# Patient Record
Sex: FEMALE | Race: WHITE | NOT HISPANIC OR LATINO | Employment: FULL TIME | URBAN - METROPOLITAN AREA
[De-identification: names, ages, dates, MRNs, and addresses within clinical notes are randomized per-mention and may not be internally consistent; named-entity substitution may affect disease eponyms.]

---

## 2020-10-23 NOTE — PROGRESS NOTES
"    [facility]    Subjective:       Patient ID: Jerry Quiles is a 45 y.o. female.    Chief Complaint: Establish Care      History was obtained from the patient and supplemented through chart review.  This patient has never been seen by an internal medicine physician with [facility] and is new to me.    HPI:    Patient is a 45 y.o. female with chronic medical problems including vertigo, HTN, acquired hypothyroidism, obesity    HTN  Didn't think HCTZ at prior doses (thinks 12.5 and 25 mg) worked sufficiently for leg swelling  Wanted more diuretic effect  Usuallytakes amlodipine 10, losartan 100, coreg 6.25 BID. Today only losartan and had decreased coreg to just once daily in order to stretch it further  Doesn't know how her blood pressure runs usually    Hx of Vertigo  Meclizine and [place]-Hallpike  Told to follow up with neurology but pt canceled appt when was supposed to be  "Collidiscope vision" helped with turning lights and taking meclizine  Alternating eyes  Will make new appt with neurology, unsure who it was (not with [employer] system)    Leg swelling  Worse at end of day, stands for work  Hoping would be better when BP is better  Never tried compression stockings.    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  Prefers to have care in [facility] due to decrease time away from work    Medical History  Past Medical History:   Diagnosis Date    HTN (hypertension)     Vertigo        Review of Systems   Constitutional: Negative for appetite change, diaphoresis and fever.   HENT: Negative for rhinorrhea and trouble swallowing.    Eyes: Positive for visual disturbance.   Respiratory: Negative for shortness of breath.    Cardiovascular: Positive for leg swelling. Negative for chest pain.   Gastrointestinal: Negative for diarrhea, nausea and vomiting.   Genitourinary: Negative for hematuria.   Musculoskeletal: Positive for myalgias. Negative for gait problem.   Neurological: Positive for dizziness. Negative for " "seizures and weakness.   Psychiatric/Behavioral: Negative for hallucinations. The patient is not nervous/anxious.          Surgical hx, family hx, social hx   No colon cancer, no breast cancer  Have been reviewed      Current Outpatient Medications:     amLODIPine (NORVASC) 10 MG tablet, Take 1 tablet (10 mg total) by mouth once daily., Disp: 30 tablet, Rfl: 0    carvediloL (COREG) 6.25 MG tablet, Take 1 tablet (6.25 mg total) by mouth 2 (two) times daily., Disp: 60 tablet, Rfl: 0    losartan (COZAAR) 100 MG tablet, Take 1 tablet (100 mg total) by mouth once daily., Disp: 30 tablet, Rfl: 0    ZOVIA 1/35E, 28, 1-35 mg-mcg per tablet, , Disp: , Rfl:     hydroCHLOROthiazide (HYDRODIURIL) 25 MG tablet, Take 2 tablets (50 mg total) by mouth once daily., Disp: 30 tablet, Rfl: 0    Objective:        Body mass index is 33.55 kg/m².  Vitals:    10/26/20 1142   Pulse: 80   SpO2: 98%   Weight: 85.9 kg (189 lb 6 oz)   Height: 5' 3" (1.6 m)   PainSc: 0-No pain     Physical Exam  Vitals signs (BMI 33.55) and nursing note reviewed.   Constitutional:       General: She is not in acute distress.     Appearance: Normal appearance. She is well-developed. She is not diaphoretic.   HENT:      Head: Normocephalic and atraumatic.   Eyes:      General: No scleral icterus.  Neck:      Musculoskeletal: Normal range of motion and neck supple.      Vascular: No JVD.   Cardiovascular:      Rate and Rhythm: Normal rate and regular rhythm.      Heart sounds: Normal heart sounds. No murmur.   Pulmonary:      Effort: Pulmonary effort is normal. No respiratory distress.      Breath sounds: Normal breath sounds. No wheezing or rales.   Abdominal:      General: There is no distension.      Palpations: Abdomen is soft. There is no mass.      Tenderness: There is no abdominal tenderness.   Musculoskeletal: Normal range of motion.         General: No swelling.   Skin:     General: Skin is warm and dry.      Capillary Refill: Capillary refill takes " less than 2 seconds.   Neurological:      Mental Status: She is alert and oriented to person, place, and time.      Cranial Nerves: No cranial nerve deficit.   Psychiatric:         Behavior: Behavior normal.           No results found for: WBC, HGB, HCT, PLT, CHOL, TRIG, HDL, LDLDIRECT, ALT, AST, NA, K, CL, CREATININE, BUN, CO2, TSH, PSA, INR, GLUF, HGBA1C, MICROALBUR    The ASCVD Risk score (Albert VALORIE Jr., et al., 2013) failed to calculate for the following reasons:    The systolic blood pressure is missing    Cannot find a previous HDL lab    Cannot find a previous total cholesterol lab    (Imaging have been independently reviewed)  None    Assessment:         1. Annual physical exam    2. Benign essential HTN    3. Leg swelling    4. Screening for HIV (human immunodeficiency virus)    5. Need for hepatitis C screening test    6. Encounter for screening mammogram for malignant neoplasm of breast          Plan:     Jerry was seen today for establish care.    Diagnoses and all orders for this visit:    Annual physical exam  -     Comprehensive Metabolic Panel; Future  -     Lipid Panel; Future  -     Ambulatory referral/consult to Obstetrics / Gynecology; Future    Benign essential HTN  Comments:  Take Coreg BID, losartan and amlodipine and monitor BP  Then start HCTZ 50 mg daily.  Counseled on easing in, backing down if feeling poorly  Given BP log  Orders:  -     amLODIPine (NORVASC) 10 MG tablet; Take 1 tablet (10 mg total) by mouth once daily.  -     carvediloL (COREG) 6.25 MG tablet; Take 1 tablet (6.25 mg total) by mouth 2 (two) times daily.  -     Added hydroCHLOROthiazide (HYDRODIURIL) 25 MG tablet; Take 2 tablets (50 mg total) by mouth once daily.  -     losartan (COZAAR) 100 MG tablet; Take 1 tablet (100 mg total) by mouth once daily.    Leg swelling  Comments:  Compression stockings, exercise, weight loss  Counseled may not improve with improved blood pressure  Orders:  -     COMPRESSION  STOCKINGS    Screening for HIV (human immunodeficiency virus)  -     HIV 1/2 Ag/Ab (4th Gen); Future    Need for hepatitis C screening test  -     Hepatitis C Antibody; Future    Encounter for screening mammogram for malignant neoplasm of breast  -     Mammo Digital Screening Bilat; Future        Health Maintenance  - Lipids: [id number]  - A1C: will check glucose  - Colon Ca Screen:  - Immunizations:    Women's health  - Pap:needs to see ob-GYN  - Mammo: ordered, needs to be scheduled  - Dexa:  N/A   - Contraception: zovia    Follow up in about 2 weeks (around 11/9/2020). BP follow-up, pap smear? Review labs        All medications were reviewed including potential side effects and risks/benefits.  Pt was counseled to call back if anything worsens or if questions arise.    Manav Molina MD  Family Medicine  [facility]  [address] [place] [address] [address] [place] [address]  Phone [phone]  Fax [phone]

## 2020-10-26 ENCOUNTER — OFFICE VISIT (OUTPATIENT)
Dept: INTERNAL MEDICINE | Facility: CLINIC | Age: 45
End: 2020-10-26
Payer: COMMERCIAL

## 2020-10-26 ENCOUNTER — LAB VISIT (OUTPATIENT)
Dept: LAB | Facility: OTHER | Age: 45
End: 2020-10-26
Attending: STUDENT IN AN ORGANIZED HEALTH CARE EDUCATION/TRAINING PROGRAM
Payer: COMMERCIAL

## 2020-10-26 VITALS — WEIGHT: 189.38 LBS | BODY MASS INDEX: 33.55 KG/M2 | HEIGHT: 63 IN | HEART RATE: 80 BPM | OXYGEN SATURATION: 98 %

## 2020-10-26 DIAGNOSIS — Z11.4 SCREENING FOR HIV (HUMAN IMMUNODEFICIENCY VIRUS): ICD-10-CM

## 2020-10-26 DIAGNOSIS — Z00.00 ANNUAL PHYSICAL EXAM: ICD-10-CM

## 2020-10-26 DIAGNOSIS — Z11.59 NEED FOR HEPATITIS C SCREENING TEST: ICD-10-CM

## 2020-10-26 DIAGNOSIS — I10 BENIGN ESSENTIAL HTN: ICD-10-CM

## 2020-10-26 DIAGNOSIS — Z00.00 ANNUAL PHYSICAL EXAM: Primary | ICD-10-CM

## 2020-10-26 DIAGNOSIS — Z12.31 ENCOUNTER FOR SCREENING MAMMOGRAM FOR MALIGNANT NEOPLASM OF BREAST: ICD-10-CM

## 2020-10-26 DIAGNOSIS — M79.89 LEG SWELLING: ICD-10-CM

## 2020-10-26 LAB
ALBUMIN SERPL BCP-MCNC: 3.5 G/DL (ref 3.5–5.2)
ALP SERPL-CCNC: 41 U/L (ref 55–135)
ALT SERPL W/O P-5'-P-CCNC: 9 U/L (ref 10–44)
ANION GAP SERPL CALC-SCNC: 10 MMOL/L (ref 8–16)
AST SERPL-CCNC: 11 U/L (ref 10–40)
BILIRUB SERPL-MCNC: 0.3 MG/DL (ref 0.1–1)
BUN SERPL-MCNC: 13 MG/DL (ref 6–20)
CALCIUM SERPL-MCNC: 8.8 MG/DL (ref 8.7–10.5)
CHLORIDE SERPL-SCNC: 106 MMOL/L (ref 95–110)
CHOLEST SERPL-MCNC: 168 MG/DL (ref 120–199)
CHOLEST/HDLC SERPL: 4.2 {RATIO} (ref 2–5)
CO2 SERPL-SCNC: 21 MMOL/L (ref 23–29)
CREAT SERPL-MCNC: 0.8 MG/DL (ref 0.5–1.4)
EST. GFR  (AFRICAN AMERICAN): >60 ML/MIN/1.73 M^2
EST. GFR  (NON AFRICAN AMERICAN): >60 ML/MIN/1.73 M^2
GLUCOSE SERPL-MCNC: 91 MG/DL (ref 70–110)
HDLC SERPL-MCNC: 40 MG/DL (ref 40–75)
HDLC SERPL: 23.8 % (ref 20–50)
LDLC SERPL CALC-MCNC: 94.6 MG/DL (ref 63–159)
NONHDLC SERPL-MCNC: 128 MG/DL
POTASSIUM SERPL-SCNC: 4 MMOL/L (ref 3.5–5.1)
PROT SERPL-MCNC: 7.3 G/DL (ref 6–8.4)
SODIUM SERPL-SCNC: 137 MMOL/L (ref 136–145)
TRIGL SERPL-MCNC: 167 MG/DL (ref 30–150)

## 2020-10-26 PROCEDURE — 3008F PR BODY MASS INDEX (BMI) DOCUMENTED: ICD-10-PCS | Mod: CPTII,S$GLB,, | Performed by: STUDENT IN AN ORGANIZED HEALTH CARE EDUCATION/TRAINING PROGRAM

## 2020-10-26 PROCEDURE — 80053 COMPREHEN METABOLIC PANEL: CPT

## 2020-10-26 PROCEDURE — 3008F BODY MASS INDEX DOCD: CPT | Mod: CPTII,S$GLB,, | Performed by: STUDENT IN AN ORGANIZED HEALTH CARE EDUCATION/TRAINING PROGRAM

## 2020-10-26 PROCEDURE — 86703 HIV-1/HIV-2 1 RESULT ANTBDY: CPT

## 2020-10-26 PROCEDURE — 99999 PR PBB SHADOW E&M-NEW PATIENT-LVL V: CPT | Mod: PBBFAC,,, | Performed by: STUDENT IN AN ORGANIZED HEALTH CARE EDUCATION/TRAINING PROGRAM

## 2020-10-26 PROCEDURE — 86803 HEPATITIS C AB TEST: CPT

## 2020-10-26 PROCEDURE — 36415 COLL VENOUS BLD VENIPUNCTURE: CPT

## 2020-10-26 PROCEDURE — 99999 PR PBB SHADOW E&M-NEW PATIENT-LVL V: ICD-10-PCS | Mod: PBBFAC,,, | Performed by: STUDENT IN AN ORGANIZED HEALTH CARE EDUCATION/TRAINING PROGRAM

## 2020-10-26 PROCEDURE — 99386 PREV VISIT NEW AGE 40-64: CPT | Mod: S$GLB,,, | Performed by: STUDENT IN AN ORGANIZED HEALTH CARE EDUCATION/TRAINING PROGRAM

## 2020-10-26 PROCEDURE — 80061 LIPID PANEL: CPT

## 2020-10-26 PROCEDURE — 99386 PR PREVENTIVE VISIT,NEW,40-64: ICD-10-PCS | Mod: S$GLB,,, | Performed by: STUDENT IN AN ORGANIZED HEALTH CARE EDUCATION/TRAINING PROGRAM

## 2020-10-26 RX ORDER — LOSARTAN POTASSIUM 100 MG/1
100 TABLET ORAL DAILY
Qty: 30 TABLET | Refills: 0 | Status: SHIPPED | OUTPATIENT
Start: 2020-10-26 | End: 2020-11-17 | Stop reason: SDUPTHER

## 2020-10-26 RX ORDER — CARVEDILOL 6.25 MG/1
6.25 TABLET ORAL 2 TIMES DAILY
Qty: 60 TABLET | Refills: 0 | Status: SHIPPED | OUTPATIENT
Start: 2020-10-26 | End: 2020-11-17 | Stop reason: SDUPTHER

## 2020-10-26 RX ORDER — HYDROCHLOROTHIAZIDE 25 MG/1
50 TABLET ORAL DAILY
Qty: 30 TABLET | Refills: 0 | Status: SHIPPED | OUTPATIENT
Start: 2020-10-26 | End: 2020-11-17 | Stop reason: SDUPTHER

## 2020-10-26 RX ORDER — LOSARTAN POTASSIUM 100 MG/1
100 TABLET ORAL DAILY
Qty: 30 TABLET | Refills: 0 | Status: SHIPPED | OUTPATIENT
Start: 2020-10-26 | End: 2020-10-26 | Stop reason: SDUPTHER

## 2020-10-26 RX ORDER — CARVEDILOL 6.25 MG/1
6.25 TABLET ORAL 2 TIMES DAILY
Qty: 60 TABLET | Refills: 0 | Status: SHIPPED | OUTPATIENT
Start: 2020-10-26 | End: 2020-10-26 | Stop reason: SDUPTHER

## 2020-10-26 RX ORDER — AMLODIPINE BESYLATE 10 MG/1
10 TABLET ORAL DAILY
Qty: 30 TABLET | Refills: 0 | Status: SHIPPED | OUTPATIENT
Start: 2020-10-26 | End: 2020-11-17 | Stop reason: SDUPTHER

## 2020-10-26 RX ORDER — LOSARTAN POTASSIUM 100 MG/1
100 TABLET ORAL
COMMUNITY
Start: 2020-06-15 | End: 2020-10-26 | Stop reason: SDUPTHER

## 2020-10-26 RX ORDER — AMLODIPINE BESYLATE 10 MG/1
10 TABLET ORAL DAILY
Qty: 30 TABLET | Refills: 0 | Status: SHIPPED | OUTPATIENT
Start: 2020-10-26 | End: 2020-10-26 | Stop reason: SDUPTHER

## 2020-10-26 RX ORDER — AMLODIPINE BESYLATE 10 MG/1
10 TABLET ORAL
COMMUNITY
Start: 2020-06-15 | End: 2020-10-26 | Stop reason: SDUPTHER

## 2020-10-26 RX ORDER — HYDROCHLOROTHIAZIDE 25 MG/1
50 TABLET ORAL DAILY
Qty: 30 TABLET | Refills: 0 | Status: SHIPPED | OUTPATIENT
Start: 2020-10-26 | End: 2020-10-26 | Stop reason: SDUPTHER

## 2020-10-26 RX ORDER — CARVEDILOL 6.25 MG/1
6.25 TABLET ORAL
COMMUNITY
Start: 2020-06-15 | End: 2020-10-26 | Stop reason: SDUPTHER

## 2020-10-26 RX ORDER — ETHYNODIOL DIACETATE AND ETHINYL ESTRADIOL 1 MG-35MCG
KIT ORAL
COMMUNITY
Start: 2020-09-14 | End: 2020-11-10 | Stop reason: SDUPTHER

## 2020-10-27 LAB
HCV AB SERPL QL IA: NEGATIVE
HIV 1+2 AB+HIV1 P24 AG SERPL QL IA: NEGATIVE

## 2020-11-10 ENCOUNTER — HOSPITAL ENCOUNTER (OUTPATIENT)
Dept: RADIOLOGY | Facility: OTHER | Age: 45
Discharge: HOME | End: 2020-11-10
Attending: STUDENT IN AN ORGANIZED HEALTH CARE EDUCATION/TRAINING PROGRAM
Payer: COMMERCIAL

## 2020-11-10 ENCOUNTER — OFFICE VISIT (OUTPATIENT)
Dept: OBSTETRICS AND GYNECOLOGY | Facility: CLINIC | Age: 45
End: 2020-11-10
Payer: COMMERCIAL

## 2020-11-10 VITALS
BODY MASS INDEX: 33.75 KG/M2 | HEIGHT: 63 IN | WEIGHT: 190.5 LBS | DIASTOLIC BLOOD PRESSURE: 82 MMHG | SYSTOLIC BLOOD PRESSURE: 118 MMHG

## 2020-11-10 DIAGNOSIS — Z12.31 ENCOUNTER FOR SCREENING MAMMOGRAM FOR MALIGNANT NEOPLASM OF BREAST: ICD-10-CM

## 2020-11-10 DIAGNOSIS — Z00.00 ANNUAL PHYSICAL EXAM: ICD-10-CM

## 2020-11-10 DIAGNOSIS — B35.6 GROIN RINGWORM: Primary | ICD-10-CM

## 2020-11-10 PROCEDURE — 99386 PREV VISIT NEW AGE 40-64: CPT | Mod: S$GLB,,, | Performed by: ADVANCED PRACTICE MIDWIFE

## 2020-11-10 PROCEDURE — 3008F BODY MASS INDEX DOCD: CPT | Mod: CPTII,S$GLB,, | Performed by: ADVANCED PRACTICE MIDWIFE

## 2020-11-10 PROCEDURE — 99999 PR PBB SHADOW E&M-EST. PATIENT-LVL III: ICD-10-PCS | Mod: PBBFAC,,, | Performed by: ADVANCED PRACTICE MIDWIFE

## 2020-11-10 PROCEDURE — 99999 PR PBB SHADOW E&M-EST. PATIENT-LVL III: CPT | Mod: PBBFAC,,, | Performed by: ADVANCED PRACTICE MIDWIFE

## 2020-11-10 PROCEDURE — 77063 MAMMO DIGITAL SCREENING BILAT WITH TOMO: ICD-10-PCS | Mod: 26,,, | Performed by: RADIOLOGY

## 2020-11-10 PROCEDURE — 77067 MAMMO DIGITAL SCREENING BILAT WITH TOMO: ICD-10-PCS | Mod: 26,,, | Performed by: RADIOLOGY

## 2020-11-10 PROCEDURE — 99386 PR PREVENTIVE VISIT,NEW,40-64: ICD-10-PCS | Mod: S$GLB,,, | Performed by: ADVANCED PRACTICE MIDWIFE

## 2020-11-10 PROCEDURE — 77063 BREAST TOMOSYNTHESIS BI: CPT | Mod: 26,,, | Performed by: RADIOLOGY

## 2020-11-10 PROCEDURE — 3008F PR BODY MASS INDEX (BMI) DOCUMENTED: ICD-10-PCS | Mod: CPTII,S$GLB,, | Performed by: ADVANCED PRACTICE MIDWIFE

## 2020-11-10 PROCEDURE — 77067 SCR MAMMO BI INCL CAD: CPT | Mod: TC

## 2020-11-10 PROCEDURE — 77067 SCR MAMMO BI INCL CAD: CPT | Mod: 26,,, | Performed by: RADIOLOGY

## 2020-11-10 RX ORDER — FLUCONAZOLE 100 MG/1
200 TABLET ORAL WEEKLY
Qty: 4 TABLET | Refills: 0 | Status: SHIPPED | OUTPATIENT
Start: 2020-11-10 | End: 2020-12-08

## 2020-11-10 RX ORDER — ETHYNODIOL DIACETATE AND ETHINYL ESTRADIOL 1 MG-35MCG
1 KIT ORAL DAILY
Qty: 90 TABLET | Refills: 4 | Status: SHIPPED | OUTPATIENT
Start: 2020-11-10 | End: 2020-12-31

## 2020-11-10 NOTE — PROGRESS NOTES
Jerry Quiles is a 45 y.o. No obstetric history on file. who presents today for her annual GYN exam.    C/C: annual GYN well woman preventative exam    HPI: She is currently sexually active and uses OC for contraception. No dyspareunia.   Has GYN related concerns. no STD testing. Reports hypertension and see history long term chronic medical conditions     MENSTRUAL HISTORY  LMP-10/27/2020. She reports regular menses occurring every 28 days.  Menses last 2-3 days with light flow.  She no dysmenorrhea.  She no intermenstrual bleeding.    PAP HISTORY: last pap 6/25/2020, result nl without HPV, denies any history of abnormal pap smear or STDs.     IMMUNIZATIONS: No Gardisil    SOCIAL HISTORY: Denies emotional/mental/physical/sexual violence or abuse. Feels safe at home.    Review of patient's allergies indicates:   Allergen Reactions    Oxaprozin Hives and Swelling     Past Medical History:   Diagnosis Date    HTN (hypertension)     Vertigo      Past Surgical History:   Procedure Laterality Date    ABLATION  2006    for SVT    CHOLECYSTECTOMY      EYE SURGERY      gastric sleeve  2012    REDUCTION OF BOTH BREASTS       Past Surgical History:   Procedure Laterality Date    ABLATION  2006    for SVT    CHOLECYSTECTOMY      EYE SURGERY      gastric sleeve  2012    REDUCTION OF BOTH BREASTS       OB History   No obstetric history on file.     OB History    No obstetric history on file.       Social History     Socioeconomic History    Marital status:      Spouse name: Not on file    Number of children: Not on file    Years of education: Not on file    Highest education level: Not on file   Occupational History     Comment: law firm   Social Needs    Financial resource strain: Not on file    Food insecurity     Worry: Not on file     Inability: Not on file    Transportation needs     Medical: Not on file     Non-medical: Not on file   Tobacco Use    Smoking status: Never Smoker    Smokeless  tobacco: Never Used   Substance and Sexual Activity    Alcohol use: Yes     Frequency: 2-4 times a month     Drinks per session: 1 or 2     Binge frequency: Never    Drug use: Never    Sexual activity: Yes     Partners: Male   Lifestyle    Physical activity     Days per week: Not on file     Minutes per session: Not on file    Stress: Not on file   Relationships    Social connections     Talks on phone: Not on file     Gets together: Not on file     Attends Catholic service: Not on file     Active member of club or organization: Not on file     Attends meetings of clubs or organizations: Not on file     Relationship status: Not on file   Other Topics Concern    Not on file   Social History Narrative    Not on file     Family History   Problem Relation Age of Onset    Hypertension Mother      Social History     Substance and Sexual Activity   Sexual Activity Yes    Partners: Male       MD KELLY Schuster  Constitutional/Gen: Negative--fever, weight change, fatigue   Skin:  Negative--Hirsutism, acne, rash  CV/vasc: Negative--chest pain, palpitations, syncope  Resp:  Negative--Cough, shortness of breath, wheezing  GI:  Negative--Nausea, vomiting, diarrhea, constipation, abdominal pain  :  Negative--Dysuria, vaginal discharge, genital sores, dyspareunia Has 3 sores at the end of her tailbone, started 2 months- treats with Lotrimin cream and spray when it is irritating  Lymph:  Negative--Swollen glands, frequent infection  Heme:  Negative--Easy bruising or bleeding, clot  Breast: Negative--Mass, lump, nipple discharge, tenderness  MS:  Negative--Back/joint pain, muscle weakness, difficulty walking  Neuro: Negative--Numbness, tingling, confusion, headaches, seizures, dizziness  Psych: Negative--Depressed mood, anxiety, insomnia  Endocrine:  Negative--Polydipsia, polyuria, heat or cold intolerance    OBJECTIVE:  There were no vitals taken for this visit.  Gen:  NAD, appears stated age, well  groomed  Skin: warm and dry w/o rash  Head: normocephalic  Mouth: no caries  Neck: supple, no masses or enlargement  Lung: normal resp effort, CTAB  Heart: normal HR, RRR   Back: negative CVAT  Breast: bilaterally--no masses, tenderness, skin changes, or nipple discharge noted  Abdomen: soft, nontender, no masses, and bowel sounds normal  External genitalia: no lesions or discharge, normal hair distribution  Urethral meatus: normal size and location, no lesions or prolapse  Vagina: normal appearance, no lesions, no discharge, no evidence cystocele or rectocele.  Cervix: normal appearance, no discharge, no lesions, negative CMT  Uterus: nontender, mobile, normal size, contour, and position.   Adnexa: no masses or tenderness  Anus: normal appearance, with no lesions or discharge. Internal exam deferred.  Extremities: FROM, with no edema or tenderness.  Neurologic: A&O x 4, non-focal, cranial nerves 2-12 grossly intact  Psych:  appropriate affect and no signs of mood, thought or memory difficulty appreciated      ASSESSMENT/PLAN:   45 y.o. female No obstetric history on file. for GYN annual well woman exam  -- Discussed ASCCP pap guidelines. Last pap 2018 result neg; next pap due no later than 2021  -- Health Teaching:  Discussed appropriate weight and nutrition. Emphasized importance of calcium and vitamin D intake. Encouraged exercise, 30-40 min 3x a week or more. BSE and health maintenance reviewed, client does them. Discussed following PCP for annual health screening. Discussed BMI and normal ranges.   --Labs recently had labs  --Mammogram did today  --Continue annual exams, return in 1 year or sooner prn    Contraceptive counseling, status, surveillance  -- Zovia        Updated Medication List:  Current Outpatient Medications   Medication Sig Dispense Refill    amLODIPine (NORVASC) 10 MG tablet Take 1 tablet (10 mg total) by mouth once daily. 30 tablet 0    carvediloL (COREG) 6.25 MG tablet Take 1 tablet (6.25  mg total) by mouth 2 (two) times daily. 60 tablet 0    hydroCHLOROthiazide (HYDRODIURIL) 25 MG tablet Take 2 tablets (50 mg total) by mouth once daily. 30 tablet 0    losartan (COZAAR) 100 MG tablet Take 1 tablet (100 mg total) by mouth once daily. 30 tablet 0    ZOVIA 1/35E, 28, 1-35 mg-mcg per tablet        No current facility-administered medications for this visit.         Wendy Gerhardt CNM/MACARIO  11/10/2020 8:04 AM

## 2020-11-16 NOTE — PROGRESS NOTES
"      [facility]    Subjective:       Patient ID: Jerry Quiles is a 45 y.o. female.    Chief Complaint: f/u Hypertension      History was obtained from the patient and supplemented through chart review.  This patient was seen by Ob-GYN since her last clinic appointment 10/26.    HPI:    Patient is a 45 y.o. female with chronic medical problems including vertigo, HTN, acquired hypothyroidism, obesity    HTN  Taking amlodipine 10, losartan 100, coreg 6.25 BID, hydrochlorothiazide 25. Tolerating well.  DBP still on the high side, and home BPs high.  Will increase HCTZ to 50  BP 11/10/20 at Ob-/82; today 132/86  1 year rx sent  Ask ot to return in March to see rian    Vertigo/ "Collidiscope vision"  Meclizine and [place]-Hallpike already performed  Told to follow up with neurology but pt canceled appt when was supposed to be  "Collidiscope vision" helped with turning off lights and taking meclizine- has happened less frequently recently  Alternating eyes  Will make new appt with neurology with [facility]    Leg swelling  Worse at end of day, stands for work  Hoping would be better when BP is better  Never tried compression stockings.  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  Prefers to have care in [facility] due to decrease time away from work    Medical History  Past Medical History:   Diagnosis Date    HTN (hypertension) 2016    Was off of BP meds after sleeve. did not take meds when at home from Covid    Vertigo 06/2020    In ICU at [facility], checked for stroke       Review of Systems   Constitutional: Negative for appetite change, diaphoresis and fever.   HENT: Negative for rhinorrhea and trouble swallowing.    Eyes: Positive for visual disturbance.   Respiratory: Negative for shortness of breath.    Cardiovascular: Positive for leg swelling. Negative for chest pain.   Gastrointestinal: Negative for diarrhea, nausea and vomiting.   Genitourinary: Negative for hematuria. " "  Musculoskeletal: Positive for myalgias. Negative for gait problem.   Neurological: Positive for dizziness. Negative for seizures and weakness.   Psychiatric/Behavioral: Negative for hallucinations. The patient is not nervous/anxious.          Surgical hx, family hx, social hx   No colon cancer, no breast cancer  Have been reviewed      Current Outpatient Medications:     amLODIPine (NORVASC) 10 MG tablet, Take 1 tablet (10 mg total) by mouth once daily., Disp: 90 tablet, Rfl: 3    carvediloL (COREG) 6.25 MG tablet, Take 1 tablet (6.25 mg total) by mouth 2 (two) times daily., Disp: 180 tablet, Rfl: 3    fluconazole (DIFLUCAN) 100 MG tablet, Take 2 tablets (200 mg total) by mouth once a week., Disp: 4 tablet, Rfl: 0    hydroCHLOROthiazide (HYDRODIURIL) 50 MG tablet, Take 1 tablet (50 mg total) by mouth once daily., Disp: 90 tablet, Rfl: 3    losartan (COZAAR) 100 MG tablet, Take 1 tablet (100 mg total) by mouth once daily., Disp: 90 tablet, Rfl: 3    ZOVIA 1/35E, 28, 1-35 mg-mcg per tablet, Take 1 tablet by mouth once daily., Disp: 90 tablet, Rfl: 4    Objective:        Body mass index is 34.09 kg/m².  Vitals:    11/17/20 0810   BP: 132/86   Pulse: 80   SpO2: 98%   Weight: 87.3 kg (192 lb 7.4 oz)   Height: 5' 3" (1.6 m)   PainSc: 0-No pain     Physical Exam  Vitals signs and nursing note reviewed.   Constitutional:       General: She is not in acute distress.     Appearance: Normal appearance. She is not ill-appearing.   HENT:      Head: Normocephalic and atraumatic.   Eyes:      General: No scleral icterus.  Neck:      Musculoskeletal: Normal range of motion.   Pulmonary:      Effort: Pulmonary effort is normal.   Abdominal:      General: There is no distension.   Musculoskeletal:         General: No deformity.   Skin:     General: Skin is warm and dry.   Neurological:      Mental Status: She is alert and oriented to person, place, and time.   Psychiatric:         Behavior: Behavior normal.           Lab " Results   Component Value Date    CHOL 168 10/26/2020    TRIG 167 (H) 10/26/2020    HDL 40 10/26/2020    ALT 9 (L) 10/26/2020    AST 11 10/26/2020     10/26/2020    K 4.0 10/26/2020     10/26/2020    CREATININE 0.8 10/26/2020    BUN 13 10/26/2020    CO2 21 (L) 10/26/2020       The 10-year ASCVD risk score (Albert EUGENE Jr., et al., 2013) is: 1.4%    Values used to calculate the score:      Age: 45 years      Sex: Female      Is Non- : No      Diabetic: No      Tobacco smoker: No      Systolic Blood Pressure: 132 mmHg      Is BP treated: Yes      HDL Cholesterol: 40 mg/dL      Total Cholesterol: 168 mg/dL    (Imaging have been independently reviewed)  mmogram Birads 1     Assessment:         1. Benign essential HTN    2. Vertigo          Plan:     Jerry was seen today for establish care.    Jerry was seen today for hypertension.    Diagnoses and all orders for this visit:    Benign essential HTN  Comments:  Improved  Cont meds, increase dose of HCTZ  Orders:  -     hydroCHLOROthiazide (HYDRODIURIL) 50 MG tablet; Take 1 tablet (50 mg total) by mouth once daily.  -     amLODIPine (NORVASC) 10 MG tablet; Take 1 tablet (10 mg total) by mouth once daily.  -     losartan (COZAAR) 100 MG tablet; Take 1 tablet (100 mg total) by mouth once daily.  -     carvediloL (COREG) 6.25 MG tablet; Take 1 tablet (6.25 mg total) by mouth 2 (two) times daily.    Vertigo  Comments:  stable  Orders:  -     Ambulatory referral/consult to Neurology; Future      Health Maintenance  - Lipids: mildly elevated TG, otherwise wnl 10/26/20  - A1C: normal fasting gluc 10/26/20  - Colon Ca Screen: needs  - Immunizations:[facility]    Women's health  - Pap: saw 11/10/20; pap due next year  - Mammo: Birads 1   - Dexa:  N/A   - Contraception: zovia    Follow up in about 4 months (around 3/17/2021). BP follow-up        All medications were reviewed including potential side effects and risks/benefits.  Pt was counseled to call back  if anything worsens or if questions arise.    Manav Molina MD  Family Medicine  [facility]  [address] [address]  Phone [phone]  Fax [phone]

## 2020-11-17 ENCOUNTER — TELEPHONE (OUTPATIENT)
Dept: INTERNAL MEDICINE | Facility: CLINIC | Age: 45
End: 2020-11-17

## 2020-11-17 ENCOUNTER — OFFICE VISIT (OUTPATIENT)
Dept: INTERNAL MEDICINE | Facility: CLINIC | Age: 45
End: 2020-11-17
Payer: COMMERCIAL

## 2020-11-17 VITALS
OXYGEN SATURATION: 98 % | DIASTOLIC BLOOD PRESSURE: 86 MMHG | SYSTOLIC BLOOD PRESSURE: 132 MMHG | HEIGHT: 63 IN | BODY MASS INDEX: 34.1 KG/M2 | HEART RATE: 80 BPM | WEIGHT: 192.44 LBS

## 2020-11-17 DIAGNOSIS — I10 BENIGN ESSENTIAL HTN: Primary | Chronic | ICD-10-CM

## 2020-11-17 DIAGNOSIS — R42 VERTIGO: Chronic | ICD-10-CM

## 2020-11-17 PROCEDURE — 1126F PR PAIN SEVERITY QUANTIFIED, NO PAIN PRESENT: ICD-10-PCS | Mod: S$GLB,,, | Performed by: STUDENT IN AN ORGANIZED HEALTH CARE EDUCATION/TRAINING PROGRAM

## 2020-11-17 PROCEDURE — 3008F BODY MASS INDEX DOCD: CPT | Mod: CPTII,S$GLB,, | Performed by: STUDENT IN AN ORGANIZED HEALTH CARE EDUCATION/TRAINING PROGRAM

## 2020-11-17 PROCEDURE — 99999 PR PBB SHADOW E&M-EST. PATIENT-LVL III: ICD-10-PCS | Mod: PBBFAC,,, | Performed by: STUDENT IN AN ORGANIZED HEALTH CARE EDUCATION/TRAINING PROGRAM

## 2020-11-17 PROCEDURE — 1126F AMNT PAIN NOTED NONE PRSNT: CPT | Mod: S$GLB,,, | Performed by: STUDENT IN AN ORGANIZED HEALTH CARE EDUCATION/TRAINING PROGRAM

## 2020-11-17 PROCEDURE — 3008F PR BODY MASS INDEX (BMI) DOCUMENTED: ICD-10-PCS | Mod: CPTII,S$GLB,, | Performed by: STUDENT IN AN ORGANIZED HEALTH CARE EDUCATION/TRAINING PROGRAM

## 2020-11-17 PROCEDURE — 99213 OFFICE O/P EST LOW 20 MIN: CPT | Mod: S$GLB,,, | Performed by: STUDENT IN AN ORGANIZED HEALTH CARE EDUCATION/TRAINING PROGRAM

## 2020-11-17 PROCEDURE — 99999 PR PBB SHADOW E&M-EST. PATIENT-LVL III: CPT | Mod: PBBFAC,,, | Performed by: STUDENT IN AN ORGANIZED HEALTH CARE EDUCATION/TRAINING PROGRAM

## 2020-11-17 PROCEDURE — 99213 PR OFFICE/OUTPT VISIT, EST, LEVL III, 20-29 MIN: ICD-10-PCS | Mod: S$GLB,,, | Performed by: STUDENT IN AN ORGANIZED HEALTH CARE EDUCATION/TRAINING PROGRAM

## 2020-11-17 RX ORDER — CARVEDILOL 6.25 MG/1
6.25 TABLET ORAL 2 TIMES DAILY
Qty: 180 TABLET | Refills: 3 | Status: SHIPPED | OUTPATIENT
Start: 2020-11-17 | End: 2021-02-22 | Stop reason: SDUPTHER

## 2020-11-17 RX ORDER — LOSARTAN POTASSIUM 100 MG/1
100 TABLET ORAL DAILY
Qty: 90 TABLET | Refills: 3 | Status: SHIPPED | OUTPATIENT
Start: 2020-11-17 | End: 2021-02-22 | Stop reason: SDUPTHER

## 2020-11-17 RX ORDER — HYDROCHLOROTHIAZIDE 50 MG/1
50 TABLET ORAL DAILY
Qty: 90 TABLET | Refills: 3 | Status: SHIPPED | OUTPATIENT
Start: 2020-11-17 | End: 2021-02-22 | Stop reason: SDUPTHER

## 2020-11-17 RX ORDER — AMLODIPINE BESYLATE 10 MG/1
10 TABLET ORAL DAILY
Qty: 90 TABLET | Refills: 3 | Status: SHIPPED | OUTPATIENT
Start: 2020-11-17 | End: 2021-02-22 | Stop reason: SDUPTHER

## 2020-11-17 NOTE — TELEPHONE ENCOUNTER
----- Message from Yoli Roblero sent at 11/16/2020  3:49 PM CST -----  Regarding: call back  Contact: Pt  Pt requesting a call back in regards to getting a appt  for Next week    Please call and advise    Phone [phone]

## 2020-12-30 ENCOUNTER — PATIENT MESSAGE (OUTPATIENT)
Dept: OBSTETRICS AND GYNECOLOGY | Facility: CLINIC | Age: 45
End: 2020-12-30

## 2020-12-30 PROBLEM — E03.9 ACQUIRED HYPOTHYROIDISM: Status: ACTIVE | Noted: 2020-12-30

## 2020-12-30 PROBLEM — R42 VERTIGO: Status: ACTIVE | Noted: 2020-12-30

## 2020-12-30 PROBLEM — I10 ESSENTIAL HYPERTENSION: Status: ACTIVE | Noted: 2020-12-30

## 2020-12-30 PROBLEM — I10 ESSENTIAL HYPERTENSION: Status: RESOLVED | Noted: 2020-12-30 | Resolved: 2020-12-30

## 2020-12-31 ENCOUNTER — TELEPHONE (OUTPATIENT)
Dept: OBSTETRICS AND GYNECOLOGY | Facility: CLINIC | Age: 45
End: 2020-12-31

## 2020-12-31 DIAGNOSIS — B36.9 FUNGAL RASH OF TRUNK: ICD-10-CM

## 2020-12-31 DIAGNOSIS — Z30.09 ENCOUNTER FOR OTHER GENERAL COUNSELING OR ADVICE ON CONTRACEPTION: Primary | ICD-10-CM

## 2020-12-31 RX ORDER — ACETAMINOPHEN AND CODEINE PHOSPHATE 120; 12 MG/5ML; MG/5ML
1 SOLUTION ORAL DAILY
Qty: 30 TABLET | Refills: 11 | Status: SHIPPED | OUTPATIENT
Start: 2020-12-31 | End: 2021-03-31

## 2020-12-31 RX ORDER — CLOTRIMAZOLE 1 %
CREAM (GRAM) TOPICAL
Qty: 30 G | Refills: 1 | Status: SHIPPED | OUTPATIENT
Start: 2020-12-31 | End: 2022-07-14 | Stop reason: SDUPTHER

## 2020-12-31 NOTE — TELEPHONE ENCOUNTER
Talked to client and reviewed the medications that were added after our clinic visit. Discussed the dangers of hypertension and combination OC's, risk on stroke, heart attack and/or blood clots. Client still wants to be on OC's but is ok with at least switching to Micronor. Instructed to continue to take this cycle of OC's and start the new ones with her next cycle. Discussed IUD and sterilization as she wants no more children. Encouraged her to follow up with OB/GYN if she wants to continue with OC's or wants a tubal ligation. She denies any headaches, visual disturbances or epigastric pain at this time. Instructed to seek medical attention immediately if any of these things happen. She also has a history of gastric bypass. She was off of BP meds for a couple of years after the bypass. She has been taking BP meds and OC's since she was in her early 20's.   She requested cream for her rash to try one more time, anti- fungal cream ordered.  Instructed to call if any further questions or need for referral.    Alecia

## 2021-01-04 ENCOUNTER — PATIENT MESSAGE (OUTPATIENT)
Dept: ADMINISTRATIVE | Facility: HOSPITAL | Age: 46
End: 2021-01-04

## 2021-02-19 ENCOUNTER — PATIENT MESSAGE (OUTPATIENT)
Dept: INTERNAL MEDICINE | Facility: CLINIC | Age: 46
End: 2021-02-19

## 2021-02-22 ENCOUNTER — PATIENT MESSAGE (OUTPATIENT)
Dept: INTERNAL MEDICINE | Facility: CLINIC | Age: 46
End: 2021-02-22

## 2021-02-22 DIAGNOSIS — I10 BENIGN ESSENTIAL HTN: Chronic | ICD-10-CM

## 2021-02-22 RX ORDER — AMLODIPINE BESYLATE 10 MG/1
10 TABLET ORAL DAILY
Qty: 90 TABLET | Refills: 0 | Status: SHIPPED | OUTPATIENT
Start: 2021-02-22 | End: 2021-05-24 | Stop reason: SDUPTHER

## 2021-02-22 RX ORDER — HYDROCHLOROTHIAZIDE 50 MG/1
50 TABLET ORAL DAILY
Qty: 90 TABLET | Refills: 0 | Status: SHIPPED | OUTPATIENT
Start: 2021-02-22 | End: 2021-03-31 | Stop reason: SDUPTHER

## 2021-02-22 RX ORDER — LOSARTAN POTASSIUM 100 MG/1
100 TABLET ORAL DAILY
Qty: 90 TABLET | Refills: 0 | Status: SHIPPED | OUTPATIENT
Start: 2021-02-22 | End: 2021-05-24 | Stop reason: SDUPTHER

## 2021-02-22 RX ORDER — CARVEDILOL 6.25 MG/1
6.25 TABLET ORAL 2 TIMES DAILY
Qty: 180 TABLET | Refills: 0 | Status: SHIPPED | OUTPATIENT
Start: 2021-02-22 | End: 2021-05-24 | Stop reason: SDUPTHER

## 2021-03-31 ENCOUNTER — LAB VISIT (OUTPATIENT)
Dept: LAB | Facility: OTHER | Age: 46
End: 2021-03-31
Attending: STUDENT IN AN ORGANIZED HEALTH CARE EDUCATION/TRAINING PROGRAM
Payer: COMMERCIAL

## 2021-03-31 ENCOUNTER — OFFICE VISIT (OUTPATIENT)
Dept: INTERNAL MEDICINE | Facility: CLINIC | Age: 46
End: 2021-03-31
Payer: COMMERCIAL

## 2021-03-31 VITALS
SYSTOLIC BLOOD PRESSURE: 128 MMHG | BODY MASS INDEX: 33.91 KG/M2 | WEIGHT: 191.38 LBS | HEART RATE: 78 BPM | HEIGHT: 63 IN | DIASTOLIC BLOOD PRESSURE: 78 MMHG | OXYGEN SATURATION: 98 %

## 2021-03-31 DIAGNOSIS — B35.4 TINEA CORPORIS: ICD-10-CM

## 2021-03-31 DIAGNOSIS — I10 ESSENTIAL HYPERTENSION: Primary | ICD-10-CM

## 2021-03-31 DIAGNOSIS — I10 BENIGN ESSENTIAL HTN: Chronic | ICD-10-CM

## 2021-03-31 DIAGNOSIS — E87.6 HYPOKALEMIA: ICD-10-CM

## 2021-03-31 DIAGNOSIS — I10 ESSENTIAL HYPERTENSION: ICD-10-CM

## 2021-03-31 LAB — POTASSIUM SERPL-SCNC: 3.4 MMOL/L (ref 3.5–5.1)

## 2021-03-31 PROCEDURE — 1126F AMNT PAIN NOTED NONE PRSNT: CPT | Mod: S$GLB,,, | Performed by: STUDENT IN AN ORGANIZED HEALTH CARE EDUCATION/TRAINING PROGRAM

## 2021-03-31 PROCEDURE — 99999 PR PBB SHADOW E&M-EST. PATIENT-LVL III: CPT | Mod: PBBFAC,,, | Performed by: STUDENT IN AN ORGANIZED HEALTH CARE EDUCATION/TRAINING PROGRAM

## 2021-03-31 PROCEDURE — 1126F PR PAIN SEVERITY QUANTIFIED, NO PAIN PRESENT: ICD-10-PCS | Mod: S$GLB,,, | Performed by: STUDENT IN AN ORGANIZED HEALTH CARE EDUCATION/TRAINING PROGRAM

## 2021-03-31 PROCEDURE — 99214 PR OFFICE/OUTPT VISIT, EST, LEVL IV, 30-39 MIN: ICD-10-PCS | Mod: S$GLB,,, | Performed by: STUDENT IN AN ORGANIZED HEALTH CARE EDUCATION/TRAINING PROGRAM

## 2021-03-31 PROCEDURE — 99214 OFFICE O/P EST MOD 30 MIN: CPT | Mod: S$GLB,,, | Performed by: STUDENT IN AN ORGANIZED HEALTH CARE EDUCATION/TRAINING PROGRAM

## 2021-03-31 PROCEDURE — 3008F BODY MASS INDEX DOCD: CPT | Mod: CPTII,S$GLB,, | Performed by: STUDENT IN AN ORGANIZED HEALTH CARE EDUCATION/TRAINING PROGRAM

## 2021-03-31 PROCEDURE — 3078F PR MOST RECENT DIASTOLIC BLOOD PRESSURE < 80 MM HG: ICD-10-PCS | Mod: CPTII,S$GLB,, | Performed by: STUDENT IN AN ORGANIZED HEALTH CARE EDUCATION/TRAINING PROGRAM

## 2021-03-31 PROCEDURE — 3008F PR BODY MASS INDEX (BMI) DOCUMENTED: ICD-10-PCS | Mod: CPTII,S$GLB,, | Performed by: STUDENT IN AN ORGANIZED HEALTH CARE EDUCATION/TRAINING PROGRAM

## 2021-03-31 PROCEDURE — 84132 ASSAY OF SERUM POTASSIUM: CPT | Performed by: STUDENT IN AN ORGANIZED HEALTH CARE EDUCATION/TRAINING PROGRAM

## 2021-03-31 PROCEDURE — 36415 COLL VENOUS BLD VENIPUNCTURE: CPT | Performed by: STUDENT IN AN ORGANIZED HEALTH CARE EDUCATION/TRAINING PROGRAM

## 2021-03-31 PROCEDURE — 99999 PR PBB SHADOW E&M-EST. PATIENT-LVL III: ICD-10-PCS | Mod: PBBFAC,,, | Performed by: STUDENT IN AN ORGANIZED HEALTH CARE EDUCATION/TRAINING PROGRAM

## 2021-03-31 PROCEDURE — 3074F SYST BP LT 130 MM HG: CPT | Mod: CPTII,S$GLB,, | Performed by: STUDENT IN AN ORGANIZED HEALTH CARE EDUCATION/TRAINING PROGRAM

## 2021-03-31 PROCEDURE — 3074F PR MOST RECENT SYSTOLIC BLOOD PRESSURE < 130 MM HG: ICD-10-PCS | Mod: CPTII,S$GLB,, | Performed by: STUDENT IN AN ORGANIZED HEALTH CARE EDUCATION/TRAINING PROGRAM

## 2021-03-31 PROCEDURE — 3078F DIAST BP <80 MM HG: CPT | Mod: CPTII,S$GLB,, | Performed by: STUDENT IN AN ORGANIZED HEALTH CARE EDUCATION/TRAINING PROGRAM

## 2021-03-31 RX ORDER — POTASSIUM CHLORIDE 750 MG/1
20 TABLET, EXTENDED RELEASE ORAL DAILY
Qty: 60 TABLET | Refills: 6 | Status: SHIPPED | OUTPATIENT
Start: 2021-03-31 | End: 2022-10-18

## 2021-03-31 RX ORDER — ITRACONAZOLE 100 MG/1
200 CAPSULE ORAL DAILY
Qty: 14 CAPSULE | Refills: 0 | Status: SHIPPED | OUTPATIENT
Start: 2021-03-31 | End: 2021-04-07 | Stop reason: SDUPTHER

## 2021-03-31 RX ORDER — ETHYNODIOL DIACETATE AND ETHINYL ESTRADIOL 1 MG-35MCG
KIT ORAL
COMMUNITY
Start: 2020-04-21 | End: 2022-07-12

## 2021-03-31 RX ORDER — HYDROCHLOROTHIAZIDE 50 MG/1
50 TABLET ORAL DAILY
Qty: 90 TABLET | Refills: 3 | Status: SHIPPED | OUTPATIENT
Start: 2021-03-31 | End: 2021-05-24 | Stop reason: SDUPTHER

## 2021-04-05 ENCOUNTER — PATIENT MESSAGE (OUTPATIENT)
Dept: ADMINISTRATIVE | Facility: HOSPITAL | Age: 46
End: 2021-04-05

## 2021-04-07 DIAGNOSIS — B35.4 TINEA CORPORIS: ICD-10-CM

## 2021-04-07 RX ORDER — ITRACONAZOLE 100 MG/1
200 CAPSULE ORAL DAILY
Qty: 8 CAPSULE | Refills: 0 | Status: SHIPPED | OUTPATIENT
Start: 2021-04-07 | End: 2021-04-09 | Stop reason: SDUPTHER

## 2021-04-08 ENCOUNTER — PATIENT MESSAGE (OUTPATIENT)
Dept: INTERNAL MEDICINE | Facility: CLINIC | Age: 46
End: 2021-04-08

## 2021-04-08 DIAGNOSIS — B35.4 TINEA CORPORIS: ICD-10-CM

## 2021-04-09 RX ORDER — ITRACONAZOLE 100 MG/1
200 CAPSULE ORAL DAILY
Qty: 8 CAPSULE | Refills: 0 | Status: SHIPPED | OUTPATIENT
Start: 2021-04-09 | End: 2021-04-13

## 2021-04-21 ENCOUNTER — PATIENT MESSAGE (OUTPATIENT)
Dept: INTERNAL MEDICINE | Facility: CLINIC | Age: 46
End: 2021-04-21

## 2021-05-24 ENCOUNTER — PATIENT MESSAGE (OUTPATIENT)
Dept: INTERNAL MEDICINE | Facility: CLINIC | Age: 46
End: 2021-05-24

## 2021-05-24 DIAGNOSIS — I10 BENIGN ESSENTIAL HTN: Chronic | ICD-10-CM

## 2021-05-24 DIAGNOSIS — T88.7XXA MEDICATION SIDE EFFECT: Primary | ICD-10-CM

## 2021-05-24 DIAGNOSIS — I10 ESSENTIAL HYPERTENSION: ICD-10-CM

## 2021-05-24 RX ORDER — LOSARTAN POTASSIUM 100 MG/1
100 TABLET ORAL DAILY
Qty: 90 TABLET | Refills: 1 | Status: SHIPPED | OUTPATIENT
Start: 2021-05-24 | End: 2022-05-11

## 2021-05-24 RX ORDER — CARVEDILOL 6.25 MG/1
6.25 TABLET ORAL 2 TIMES DAILY
Qty: 180 TABLET | Refills: 1 | Status: SHIPPED | OUTPATIENT
Start: 2021-05-24 | End: 2022-07-12 | Stop reason: SDUPTHER

## 2021-05-24 RX ORDER — HYDROCHLOROTHIAZIDE 50 MG/1
50 TABLET ORAL DAILY
Qty: 90 TABLET | Refills: 1 | Status: SHIPPED | OUTPATIENT
Start: 2021-05-24 | End: 2022-08-18 | Stop reason: SDUPTHER

## 2021-05-24 RX ORDER — AMLODIPINE BESYLATE 10 MG/1
10 TABLET ORAL DAILY
Qty: 90 TABLET | Refills: 1 | Status: SHIPPED | OUTPATIENT
Start: 2021-05-24 | End: 2022-05-11

## 2021-05-25 ENCOUNTER — TELEPHONE (OUTPATIENT)
Dept: INTERNAL MEDICINE | Facility: CLINIC | Age: 46
End: 2021-05-25

## 2021-05-26 ENCOUNTER — TELEPHONE (OUTPATIENT)
Dept: INTERNAL MEDICINE | Facility: CLINIC | Age: 46
End: 2021-05-26

## 2021-07-06 ENCOUNTER — PATIENT MESSAGE (OUTPATIENT)
Dept: ADMINISTRATIVE | Facility: HOSPITAL | Age: 46
End: 2021-07-06

## 2021-08-11 ENCOUNTER — PATIENT OUTREACH (OUTPATIENT)
Dept: ADMINISTRATIVE | Facility: OTHER | Age: 46
End: 2021-08-11

## 2021-08-20 ENCOUNTER — OFFICE VISIT (OUTPATIENT)
Dept: DERMATOLOGY | Facility: CLINIC | Age: 46
End: 2021-08-20
Payer: COMMERCIAL

## 2021-08-20 ENCOUNTER — PATIENT MESSAGE (OUTPATIENT)
Dept: DERMATOLOGY | Facility: CLINIC | Age: 46
End: 2021-08-20

## 2021-08-20 DIAGNOSIS — R21 RASH AND NONSPECIFIC SKIN ERUPTION: Primary | ICD-10-CM

## 2021-08-20 PROCEDURE — 99202 PR OFFICE/OUTPT VISIT, NEW, LEVL II, 15-29 MIN: ICD-10-PCS | Mod: 95,,, | Performed by: DERMATOLOGY

## 2021-08-20 PROCEDURE — 99202 OFFICE O/P NEW SF 15 MIN: CPT | Mod: 95,,, | Performed by: DERMATOLOGY

## 2021-08-22 ENCOUNTER — PATIENT MESSAGE (OUTPATIENT)
Dept: DERMATOLOGY | Facility: CLINIC | Age: 46
End: 2021-08-22

## 2021-08-23 ENCOUNTER — TELEPHONE (OUTPATIENT)
Dept: DERMATOLOGY | Facility: CLINIC | Age: 46
End: 2021-08-23

## 2021-08-24 RX ORDER — TERBINAFINE HYDROCHLORIDE 250 MG/1
250 TABLET ORAL DAILY
Qty: 14 TABLET | Refills: 0 | Status: SHIPPED | OUTPATIENT
Start: 2021-08-24 | End: 2021-09-07

## 2021-08-24 RX ORDER — FLUTICASONE PROPIONATE 0.5 MG/G
CREAM TOPICAL 2 TIMES DAILY
Qty: 30 G | Refills: 1 | Status: SHIPPED | OUTPATIENT
Start: 2021-08-24 | End: 2022-10-05

## 2021-08-24 RX ORDER — KETOCONAZOLE 20 MG/G
CREAM TOPICAL 2 TIMES DAILY
Qty: 60 G | Refills: 1 | Status: SHIPPED | OUTPATIENT
Start: 2021-08-24 | End: 2023-06-23

## 2021-09-12 ENCOUNTER — PATIENT MESSAGE (OUTPATIENT)
Dept: INTERNAL MEDICINE | Facility: CLINIC | Age: 46
End: 2021-09-12

## 2021-09-14 ENCOUNTER — TELEPHONE (OUTPATIENT)
Dept: INTERNAL MEDICINE | Facility: CLINIC | Age: 46
End: 2021-09-14

## 2021-10-04 ENCOUNTER — PATIENT MESSAGE (OUTPATIENT)
Dept: ADMINISTRATIVE | Facility: HOSPITAL | Age: 46
End: 2021-10-04

## 2022-02-23 DIAGNOSIS — Z12.31 OTHER SCREENING MAMMOGRAM: ICD-10-CM

## 2022-03-22 ENCOUNTER — PATIENT MESSAGE (OUTPATIENT)
Dept: ADMINISTRATIVE | Facility: HOSPITAL | Age: 47
End: 2022-03-22
Payer: COMMERCIAL

## 2022-03-22 DIAGNOSIS — Z12.11 SCREENING FOR COLON CANCER: ICD-10-CM

## 2022-04-13 DIAGNOSIS — Z12.11 COLON CANCER SCREENING: ICD-10-CM

## 2022-05-11 DIAGNOSIS — I10 BENIGN ESSENTIAL HTN: Chronic | ICD-10-CM

## 2022-05-11 DIAGNOSIS — B35.4 TINEA CORPORIS: Primary | ICD-10-CM

## 2022-05-11 RX ORDER — LOSARTAN POTASSIUM 100 MG/1
TABLET ORAL
Qty: 30 TABLET | Refills: 0 | Status: SHIPPED | OUTPATIENT
Start: 2022-05-11 | End: 2022-07-12 | Stop reason: SDUPTHER

## 2022-05-11 RX ORDER — AMLODIPINE BESYLATE 10 MG/1
TABLET ORAL
Qty: 30 TABLET | Refills: 0 | Status: SHIPPED | OUTPATIENT
Start: 2022-05-11 | End: 2022-07-13

## 2022-05-11 NOTE — TELEPHONE ENCOUNTER
Care Due:                  Date            Visit Type   Department     Provider  --------------------------------------------------------------------------------                                MYCHART                              FOLLOWUP/OF  [facility] INTERNAL  Last Visit: 03-      FICE VISIT   MEDICINE       Manav Molina  Next Visit: None Scheduled  None         None Found                                                            Last  Test          Frequency    Reason                     Performed    Due Date  --------------------------------------------------------------------------------    Office Visit  12 months..  amLODIPine, carvediloL,    03- 03-                             hydroCHLOROthiazide,                             losartan, potassium......    CMP.........  12 months..  hydroCHLOROthiazide,       10-   10-                             losartan, potassium......    [employer] Embedded Care Gaps. Reference number: [id number]. 5/11/2022   2:09:00 PM CDT

## 2022-05-11 NOTE — TELEPHONE ENCOUNTER
Refill Routing Note   Medication(s) are not appropriate for processing by [facility] for the following reason(s):      - Required laboratory values are outdated  - Required vitals are outdated    [facility] action(s):  Defer Medication-related problems identified:   Requires labs  Requires appointment        Medication reconciliation completed: No     Appointments  past 12m or future 3m with PCP    Date Provider   Last Visit   3/31/2021 Manav Molina MD   Next Visit   Visit date not found Manav Molina MD   ED visits in past 90 days: 0        Note composed:4:51 PM 05/11/2022

## 2022-05-13 ENCOUNTER — PATIENT MESSAGE (OUTPATIENT)
Dept: INTERNAL MEDICINE | Facility: CLINIC | Age: 47
End: 2022-05-13
Payer: COMMERCIAL

## 2022-05-13 RX ORDER — PRENATAL VIT 91/IRON/FOLIC/DHA 28-975-200
COMBINATION PACKAGE (EA) ORAL 2 TIMES DAILY
Qty: 15 G | Refills: 0 | Status: SHIPPED | OUTPATIENT
Start: 2022-05-13 | End: 2022-07-12

## 2022-05-13 RX ORDER — FLUCONAZOLE 150 MG/1
150 TABLET ORAL
Qty: 4 TABLET | Refills: 0 | Status: SHIPPED | OUTPATIENT
Start: 2022-05-13 | End: 2022-05-14

## 2022-05-13 NOTE — TELEPHONE ENCOUNTER
"Spoke with pt, they had a verbal understanding.  Scheduled pt for annual on 7/12/22. Pt states she is still having trouble with ringworms. Pt is asking for a prescription for "the strongest antifungal medication possible." She states if it does not work she will then schedule appt with derm.   "

## 2022-05-19 ENCOUNTER — PATIENT MESSAGE (OUTPATIENT)
Dept: ADMINISTRATIVE | Facility: HOSPITAL | Age: 47
End: 2022-05-19
Payer: COMMERCIAL

## 2022-05-30 ENCOUNTER — PATIENT MESSAGE (OUTPATIENT)
Dept: ADMINISTRATIVE | Facility: HOSPITAL | Age: 47
End: 2022-05-30
Payer: COMMERCIAL

## 2022-07-11 ENCOUNTER — PATIENT MESSAGE (OUTPATIENT)
Dept: INTERNAL MEDICINE | Facility: CLINIC | Age: 47
End: 2022-07-11
Payer: COMMERCIAL

## 2022-07-12 ENCOUNTER — PATIENT MESSAGE (OUTPATIENT)
Dept: INTERNAL MEDICINE | Facility: CLINIC | Age: 47
End: 2022-07-12

## 2022-07-12 ENCOUNTER — TELEPHONE (OUTPATIENT)
Dept: INTERNAL MEDICINE | Facility: CLINIC | Age: 47
End: 2022-07-12

## 2022-07-12 ENCOUNTER — OFFICE VISIT (OUTPATIENT)
Dept: INTERNAL MEDICINE | Facility: CLINIC | Age: 47
End: 2022-07-12
Payer: COMMERCIAL

## 2022-07-12 DIAGNOSIS — E66.01 CLASS 2 SEVERE OBESITY DUE TO EXCESS CALORIES WITH SERIOUS COMORBIDITY AND BODY MASS INDEX (BMI) OF 35.0 TO 35.9 IN ADULT: Primary | ICD-10-CM

## 2022-07-12 DIAGNOSIS — Z91.89 AT RISK FOR SIDE EFFECT OF MEDICATION: ICD-10-CM

## 2022-07-12 DIAGNOSIS — M25.559 HIP PAIN: ICD-10-CM

## 2022-07-12 DIAGNOSIS — E66.09 OBESITY DUE TO EXCESS CALORIES WITH SERIOUS COMORBIDITY, UNSPECIFIED CLASSIFICATION: ICD-10-CM

## 2022-07-12 DIAGNOSIS — I10 ESSENTIAL HYPERTENSION: ICD-10-CM

## 2022-07-12 DIAGNOSIS — I10 BENIGN ESSENTIAL HTN: Chronic | ICD-10-CM

## 2022-07-12 DIAGNOSIS — Z00.00 ANNUAL PHYSICAL EXAM: Primary | ICD-10-CM

## 2022-07-12 DIAGNOSIS — E66.01 CLASS 2 SEVERE OBESITY DUE TO EXCESS CALORIES WITH SERIOUS COMORBIDITY AND BODY MASS INDEX (BMI) OF 35.0 TO 35.9 IN ADULT: ICD-10-CM

## 2022-07-12 DIAGNOSIS — M25.559 HIP PAIN: Primary | ICD-10-CM

## 2022-07-12 PROCEDURE — 4010F ACE/ARB THERAPY RXD/TAKEN: CPT | Mod: CPTII,95,, | Performed by: STUDENT IN AN ORGANIZED HEALTH CARE EDUCATION/TRAINING PROGRAM

## 2022-07-12 PROCEDURE — 99215 PR OFFICE/OUTPT VISIT, EST, LEVL V, 40-54 MIN: ICD-10-PCS | Mod: 95,,, | Performed by: STUDENT IN AN ORGANIZED HEALTH CARE EDUCATION/TRAINING PROGRAM

## 2022-07-12 PROCEDURE — 4010F PR ACE/ARB THEARPY RXD/TAKEN: ICD-10-PCS | Mod: CPTII,95,, | Performed by: STUDENT IN AN ORGANIZED HEALTH CARE EDUCATION/TRAINING PROGRAM

## 2022-07-12 PROCEDURE — 99215 OFFICE O/P EST HI 40 MIN: CPT | Mod: 95,,, | Performed by: STUDENT IN AN ORGANIZED HEALTH CARE EDUCATION/TRAINING PROGRAM

## 2022-07-12 RX ORDER — IBUPROFEN AND FAMOTIDINE 26.6; 8 MG/1; MG/1
1 TABLET ORAL 2 TIMES DAILY PRN
Qty: 30 TABLET | Refills: 2 | Status: SHIPPED | OUTPATIENT
Start: 2022-07-12 | End: 2022-07-12

## 2022-07-12 RX ORDER — CARVEDILOL 6.25 MG/1
6.25 TABLET ORAL 2 TIMES DAILY
Qty: 180 TABLET | Refills: 1 | Status: SHIPPED | OUTPATIENT
Start: 2022-07-12 | End: 2022-07-14 | Stop reason: SDUPTHER

## 2022-07-12 RX ORDER — LOSARTAN POTASSIUM 100 MG/1
100 TABLET ORAL DAILY
Qty: 90 TABLET | Refills: 1 | Status: SHIPPED | OUTPATIENT
Start: 2022-07-12 | End: 2022-07-13

## 2022-07-12 RX ORDER — IBUPROFEN AND FAMOTIDINE 26.6; 8 MG/1; MG/1
1 TABLET ORAL 2 TIMES DAILY PRN
Qty: 30 TABLET | Refills: 2 | Status: SHIPPED | OUTPATIENT
Start: 2022-07-12 | End: 2022-07-13

## 2022-07-12 RX ORDER — PEN NEEDLE, DIABETIC 30 GX3/16"
1 NEEDLE, DISPOSABLE MISCELLANEOUS WEEKLY
Qty: 200 EACH | Refills: 3 | Status: SHIPPED | OUTPATIENT
Start: 2022-07-12 | End: 2022-07-14

## 2022-07-12 RX ORDER — METFORMIN HYDROCHLORIDE 500 MG/1
TABLET, EXTENDED RELEASE ORAL
Qty: 148 TABLET | Refills: 1 | Status: SHIPPED | OUTPATIENT
Start: 2022-07-12 | End: 2022-07-14 | Stop reason: SDUPTHER

## 2022-07-12 RX ORDER — PEN NEEDLE, DIABETIC 30 GX3/16"
1 NEEDLE, DISPOSABLE MISCELLANEOUS WEEKLY
Qty: 200 EACH | Refills: 3 | Status: SHIPPED | OUTPATIENT
Start: 2022-07-12 | End: 2022-07-12

## 2022-07-12 NOTE — PROGRESS NOTES
The patient location is: [facility]  The chief complaint leading to consultation is: HTN    Visit type: audiovisual, converted to audio    Face to Face time with patient: 40  50 minutes of total time spent on the encounter, which includes face to face time and non-face to face time preparing to see the patient (eg, review of tests), Obtaining and/or reviewing separately obtained history, Documenting clinical information in the electronic or other health record, Independently interpreting results (not separately reported) and communicating results to the patient/family/caregiver, or Care coordination (not separately reported).         Each patient to whom he or she provides medical services by telemedicine is:  (1) informed of the relationship between the physician and patient and the respective role of any other health care provider with respect to management of the patient; and (2) notified that he or she may decline to receive medical services by telemedicine and may withdraw from such care at any time.    Notes:       [facility]    Subjective:       Patient ID: Jerry Quiles is a 46 y.o. female.    Chief Complaint: f/u No chief complaint on file.      History was obtained from the patient and supplemented through chart review.  This pt is known to me.    HPI:    Patient is a 46 y.o. female with chronic medical problems including vertigo, HTN, acquired hypothyroidism, obesity    Noncompliance  Failed to follow-up and failed to get K rechecked, take K despite warnings  Strict precautions given this time    HTN  Taking amlodipine 10, losartan 100, coreg 6.25 BID, hydrochlorothiazide 50.  Had increased dose of hctz, failed to get f/u labs  Controlled? Per pt.  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  Prefers to have care in [facility] due to decrease time away from work     Medical History  Past Medical History:   Diagnosis Date    HTN (hypertension) 2016    Was off of BP meds after sleeve. did not take meds when at home from Covid    Vertigo 06/2020    In ICU at [facility], checked for stroke       Review of Systems   Constitutional: Negative for fever.   HENT: Negative for trouble swallowing.    Respiratory: Negative for shortness of breath.    Cardiovascular: Negative for chest pain.   Gastrointestinal: Negative for constipation, diarrhea, nausea and vomiting.   Musculoskeletal: Positive for arthralgias and myalgias. Negative for gait problem.   Neurological: Negative for dizziness and seizures.   Psychiatric/Behavioral: Negative for hallucinations.         Surgical hx, family hx, social hx   No colon cancer, no breast cancer  Have been reviewed      Current Outpatient Medications:     amLODIPine (NORVASC) 10 MG tablet, TAKE 1 TABLET BY MOUTH ONCE DAILY, Disp: 30 tablet, Rfl: 0    carvediloL (COREG) 6.25 MG tablet, Take 1 tablet (6.25 mg total) by mouth 2 (two) times daily., Disp: 180 tablet, Rfl: 1    clotrimazole (LOTRIMIN) 1 % cream, Apply to affected area 2 times daily, Disp: 30 g, Rfl: 1    fluticasone propionate (CUTIVATE) 0.05 % cream, Apply topically 2 (two) times daily. Poison ivy rash, Disp: 30 g, Rfl: 1    hydroCHLOROthiazide (HYDRODIURIL) 50 MG tablet, Take 1 tablet (50 mg total) by mouth once daily., Disp: 90 tablet, Rfl: 1    ibuprofen-famotidine (DUEXIS) 800-26.6 mg Tab, Take 1 tablet by mouth 2 (two) times daily as needed (hip pain)., Disp: 30 tablet, Rfl: 2    ketoconazole (NIZORAL) 2 % cream, Apply topically 2 (two) times daily. buttock, Disp: 60 g, Rfl: 1    losartan (COZAAR) 100 MG tablet, Take 1 tablet (100 mg total) by mouth once daily., Disp: 90 tablet, Rfl: 1    pen needle, diabetic 32 gauge x 5/32" Ndle, 1 each by " Misc.(Non-Drug; Combo Route) route once a week., Disp: 200 each, Rfl: 3    potassium chloride SA (K-DUR,KLOR-CON) 10 MEQ tablet, Take 2 tablets (20 mEq total) by mouth once daily., Disp: 60 tablet, Rfl: 6    semaglutide (OZEMPIC) 0.25 mg or 0.5 mg(2 mg/1.5 mL) pen injector, Inject 0.25 mg into the skin every 7 days. Once run out, if tolerating, will increase to 0.5mg weekly dose, Disp: 1 pen, Rfl: 0    Objective:        There is no height or weight on file to calculate BMI.  There were no vitals filed for this visit.  Physical Exam  Vitals and nursing note reviewed.   Constitutional:       General: She is not in acute distress.     Appearance: Normal appearance. She is not ill-appearing.   HENT:      Head: Normocephalic and atraumatic.      Nose:      Comments: Wearing a mask  Eyes:      General: No scleral icterus.  Pulmonary:      Effort: Pulmonary effort is normal.   Musculoskeletal:         General: No deformity.      Cervical back: Normal range of motion.   Neurological:      Mental Status: She is alert and oriented to person, place, and time.   Psychiatric:         Behavior: Behavior normal.           Lab Results   Component Value Date    CHOL 168 10/26/2020    TRIG 167 (H) 10/26/2020    HDL 40 10/26/2020    ALT 9 (L) 10/26/2020    AST 11 10/26/2020     10/26/2020    K 3.4 (L) 03/31/2021     10/26/2020    CREATININE 0.8 10/26/2020    BUN 13 10/26/2020    CO2 21 (L) 10/26/2020       The 10-year ASCVD risk score (Flushing VALORIE Jr., et al., 2013) is: 1.6%    Values used to calculate the score:      Age: 46 years      Sex: Female      Is Non- : No      Diabetic: No      Tobacco smoker: No      Systolic Blood Pressure: 134 mmHg      Is BP treated: Yes      HDL Cholesterol: 40 mg/dL      Total Cholesterol: 168 mg/dL    (Imaging have been independently reviewed)  mmogram Birads 1     Assessment:         1. Annual physical exam    2. At risk for side effect of medication    3. Benign  "essential HTN    4. Hip pain    5. Obesity due to excess calories with serious comorbidity, unspecified classification    6. Class 2 severe obesity due to excess calories with serious comorbidity and body mass index (BMI) of 35.0 to 35.9 in adult          Plan:     Jerry was seen today for establish care.    Diagnoses and all orders for this visit:    Annual physical exam  -     Comprehensive Metabolic Panel; Future  -     Lipid Panel; Future  -     TSH; Future  -     CBC Auto Differential; Future  -     Hemoglobin A1C; Future    At risk for side effect of medication  -     Magnesium; Future    Benign essential HTN  Comments:  Improved  Cont meds, increase dose of HCTZ  Orders:  -     carvediloL (COREG) 6.25 MG tablet; Take 1 tablet (6.25 mg total) by mouth 2 (two) times daily.  -     losartan (COZAAR) 100 MG tablet; Take 1 tablet (100 mg total) by mouth once daily.    Hip pain  -     Discontinue: ibuprofen-famotidine (DUEXIS) 800-26.6 mg Tab; Take 1 tablet by mouth 2 (two) times daily as needed (hip pain).  -     ibuprofen-famotidine (DUEXIS) 800-26.6 mg Tab; Take 1 tablet by mouth 2 (two) times daily as needed (hip pain).    Obesity due to excess calories with serious comorbidity, unspecified classification    Class 2 severe obesity due to excess calories with serious comorbidity and body mass index (BMI) of 35.0 to 35.9 in adult  -     semaglutide (OZEMPIC) 0.25 mg or 0.5 mg(2 mg/1.5 mL) pen injector; Inject 0.25 mg into the skin every 7 days. Once run out, if tolerating, will increase to 0.5mg weekly dose  -     pen needle, diabetic 32 gauge x 5/32" Ndle; 1 each by Misc.(Non-Drug; Combo Route) route once a week.         Health Maintenance  - Lipids: ordered  - A1C: ordered  - Colon Ca Screen: needs,  Pt delaying, encouraged  - Immunizations:UTD    Women's health  - Pap: follows with gyn. Overdue for pap. Advised to obtain  - Mammo: Birads 1 11/20; needs, ordered  - Dexa:  N/A   - Contraception: IUD    Follow up " for within 1-2 months for annual in person.    needs labs ASAP  40-54 min were used in chart review, evaluation and counseling of patient, coordination of appt given loss of power in office and instrutions for virtual visit, documentation and review of results on same day of service      All medications were reviewed including potential side effects and risks/benefits.  Pt was counseled to call back if anything worsens or if questions arise.    Manav Molina MD  Family Medicine  [facility]  [facility] [address] [address]  Phone [phone]  Fax [phone]

## 2022-07-12 NOTE — TELEPHONE ENCOUNTER
Care Due:                  Date            Visit Type   Department     Provider  --------------------------------------------------------------------------------                                ESTABLISHED                              PATIENT -    [facility] INTERNAL  Last Visit: 07-      VIRTUAL      MEDICINE       Manav Molina                              EP -                              PRIMARY      [facility] INTERNAL  Next Visit: 08-      CARE ([facility])   MEDICINE       Manav Molina                                                            Last  Test          Frequency    Reason                     Performed    Due Date  --------------------------------------------------------------------------------    CMP.........  12 months..  losartan, potassium......  10-   10-    HBA1C.......  6 months...  semaglutide..............  Not Found    Overdue    [employer] Embedded Care Gaps. Reference number: [id number]. 7/12/2022   5:28:11 PM CDT

## 2022-07-13 ENCOUNTER — TELEPHONE (OUTPATIENT)
Dept: PHARMACY | Facility: CLINIC | Age: 47
End: 2022-07-13
Payer: COMMERCIAL

## 2022-07-13 ENCOUNTER — PATIENT MESSAGE (OUTPATIENT)
Dept: INTERNAL MEDICINE | Facility: CLINIC | Age: 47
End: 2022-07-13
Payer: COMMERCIAL

## 2022-07-13 VITALS — SYSTOLIC BLOOD PRESSURE: 126 MMHG | DIASTOLIC BLOOD PRESSURE: 90 MMHG

## 2022-07-13 RX ORDER — AMLODIPINE BESYLATE 10 MG/1
TABLET ORAL
Qty: 90 TABLET | Refills: 1 | Status: SHIPPED | OUTPATIENT
Start: 2022-07-13 | End: 2022-11-26

## 2022-07-13 RX ORDER — HYDROCHLOROTHIAZIDE 50 MG/1
TABLET ORAL
Qty: 90 TABLET | Refills: 0 | OUTPATIENT
Start: 2022-07-13

## 2022-07-13 RX ORDER — MELOXICAM 7.5 MG/1
7.5 TABLET ORAL DAILY
Qty: 30 TABLET | Refills: 1 | Status: SHIPPED | OUTPATIENT
Start: 2022-07-13 | End: 2023-06-23

## 2022-07-13 RX ORDER — LOSARTAN POTASSIUM 100 MG/1
TABLET ORAL
Qty: 90 TABLET | Refills: 1 | Status: SHIPPED | OUTPATIENT
Start: 2022-07-13 | End: 2022-10-31

## 2022-07-13 NOTE — TELEPHONE ENCOUNTER
----- Message from Pat Méndez sent at 7/12/2022  5:31 PM CDT -----  Regarding: Patient advice  Contact: Pt  Pt called in regards to getting an medication refill       amLODIPine (NORVASC) 10 MG tablet 30 tablet 0 5/11/2022  No  Sig: TAKE 1 TABLET BY MOUTH ONCE DAILY  Sent to pharmacy as: amLODIPine (NORVASC) 10 MG tablet  Class: Normal  Order: [id number]  Date/Time Signed: 5/11/2022 17:36      E-Prescribing Status: Receipt confirmed by pharmacy (5/11/2022  5:36 PM CDT)    [id number] - [place], [place] - [address]  [phone]

## 2022-07-13 NOTE — TELEPHONE ENCOUNTER
Spoke to Ms. Quiles and confirmed appt for Mammogram and Office visit.  Patient states that she will walk into lab to have lab work done.      Patient states that her medication Duexis is not covered by her insurance and would like to know what is an alternative for the medication and can [facility].

## 2022-07-13 NOTE — TELEPHONE ENCOUNTER
Refill Routing Note   Medication(s) are not appropriate for processing by [facility] for the following reason(s):      - Required laboratory values are outdated  - Required vitals are outdated    [facility] action(s):  Defer Medication-related problems identified: Requires labs     Medication Therapy Plan: change in pharmacy [facility]  Medication reconciliation completed: No     Appointments  past 12m or future 3m with PCP    Date Provider   Last Visit   7/12/2022 Manav Molina MD   Next Visit   8/18/2022 Manav Molina MD   ED visits in past 90 days: 0        Note composed:9:00 AM 07/13/2022

## 2022-07-14 ENCOUNTER — PATIENT MESSAGE (OUTPATIENT)
Dept: INTERNAL MEDICINE | Facility: CLINIC | Age: 47
End: 2022-07-14
Payer: COMMERCIAL

## 2022-07-14 DIAGNOSIS — B36.9 FUNGAL RASH OF TRUNK: ICD-10-CM

## 2022-07-14 DIAGNOSIS — I10 BENIGN ESSENTIAL HTN: Chronic | ICD-10-CM

## 2022-07-14 DIAGNOSIS — E66.01 CLASS 2 SEVERE OBESITY DUE TO EXCESS CALORIES WITH SERIOUS COMORBIDITY AND BODY MASS INDEX (BMI) OF 35.0 TO 35.9 IN ADULT: ICD-10-CM

## 2022-07-14 DIAGNOSIS — Z91.89 AT RISK FOR SIDE EFFECT OF MEDICATION: Primary | ICD-10-CM

## 2022-07-14 RX ORDER — CLOTRIMAZOLE 1 %
CREAM (GRAM) TOPICAL
Qty: 30 G | Refills: 1 | Status: SHIPPED | OUTPATIENT
Start: 2022-07-14 | End: 2023-01-31

## 2022-07-14 RX ORDER — METFORMIN HYDROCHLORIDE 500 MG/1
TABLET, EXTENDED RELEASE ORAL
Qty: 148 TABLET | Refills: 1 | Status: SHIPPED | OUTPATIENT
Start: 2022-07-14 | End: 2022-10-31

## 2022-07-14 RX ORDER — CARVEDILOL 6.25 MG/1
6.25 TABLET ORAL 2 TIMES DAILY
Qty: 180 TABLET | Refills: 1 | Status: SHIPPED | OUTPATIENT
Start: 2022-07-14 | End: 2023-06-23

## 2022-07-14 RX ORDER — FAMOTIDINE 20 MG/1
20 TABLET, FILM COATED ORAL NIGHTLY PRN
Qty: 30 TABLET | Refills: 3 | Status: SHIPPED | OUTPATIENT
Start: 2022-07-14 | End: 2024-03-15

## 2022-07-14 NOTE — TELEPHONE ENCOUNTER
Hello,       The prior authorization for Jerry Quiles's Ozempic prescription has been DENIED for the following reason(s):     [facility], [employer]     Based on the information provided, you do not meet the established medication-specific criteria or guidelines for Ozempic at this time.   The request for coverage for OZEMPIC INJ 2/1.5ML, use as directed (1.5 per month), is denied.   This decision is based on health plan criteria for OZEMPIC INJ 2/1.5ML. This medicine is covered only if:   **You have a diagnosis of type 2 diabetes mellitus.       If you would like to APPEAL the decision, please contact the patient's insurance at [phone].   Plan member ID: [id number]   Case number: [id number]   Fax: [phone]   Expedited/Urgent Fax: [phone]     If there are any additional questions or concerns, please contact me.     Sincerely,   Kei Bowen   Prior Authorization Department   [facility]

## 2022-07-18 ENCOUNTER — PATIENT MESSAGE (OUTPATIENT)
Dept: INTERNAL MEDICINE | Facility: CLINIC | Age: 47
End: 2022-07-18
Payer: COMMERCIAL

## 2022-07-20 ENCOUNTER — PATIENT MESSAGE (OUTPATIENT)
Dept: INTERNAL MEDICINE | Facility: CLINIC | Age: 47
End: 2022-07-20
Payer: COMMERCIAL

## 2022-07-20 DIAGNOSIS — I10 PRIMARY HYPERTENSION: ICD-10-CM

## 2022-07-20 RX ORDER — TIRZEPATIDE 2.5 MG/.5ML
2.5 INJECTION, SOLUTION SUBCUTANEOUS
Qty: 2 ML | Refills: 2 | Status: SHIPPED | OUTPATIENT
Start: 2022-07-20 | End: 2022-08-15

## 2022-07-20 RX ORDER — PEN NEEDLE, DIABETIC 30 GX3/16"
1 NEEDLE, DISPOSABLE MISCELLANEOUS WEEKLY
Qty: 200 EACH | Refills: 2 | Status: SHIPPED | OUTPATIENT
Start: 2022-07-20 | End: 2023-06-23

## 2022-07-25 ENCOUNTER — PATIENT MESSAGE (OUTPATIENT)
Dept: INTERNAL MEDICINE | Facility: CLINIC | Age: 47
End: 2022-07-25
Payer: COMMERCIAL

## 2022-08-01 ENCOUNTER — PATIENT OUTREACH (OUTPATIENT)
Dept: INTERNAL MEDICINE | Facility: CLINIC | Age: 47
End: 2022-08-01
Payer: COMMERCIAL

## 2022-08-01 ENCOUNTER — PATIENT MESSAGE (OUTPATIENT)
Dept: INTERNAL MEDICINE | Facility: CLINIC | Age: 47
End: 2022-08-01
Payer: COMMERCIAL

## 2022-08-10 ENCOUNTER — PATIENT MESSAGE (OUTPATIENT)
Dept: INTERNAL MEDICINE | Facility: CLINIC | Age: 47
End: 2022-08-10
Payer: COMMERCIAL

## 2022-08-10 DIAGNOSIS — I10 PRIMARY HYPERTENSION: ICD-10-CM

## 2022-08-15 RX ORDER — TIRZEPATIDE 5 MG/.5ML
5 INJECTION, SOLUTION SUBCUTANEOUS
Qty: 2 ML | Refills: 2 | Status: SHIPPED | OUTPATIENT
Start: 2022-08-15 | End: 2022-09-12

## 2022-08-16 ENCOUNTER — PATIENT OUTREACH (OUTPATIENT)
Dept: ADMINISTRATIVE | Facility: HOSPITAL | Age: 47
End: 2022-08-16
Payer: COMMERCIAL

## 2022-08-18 ENCOUNTER — PATIENT MESSAGE (OUTPATIENT)
Dept: INTERNAL MEDICINE | Facility: CLINIC | Age: 47
End: 2022-08-18
Payer: COMMERCIAL

## 2022-08-18 ENCOUNTER — LAB VISIT (OUTPATIENT)
Dept: LAB | Facility: OTHER | Age: 47
End: 2022-08-18
Attending: STUDENT IN AN ORGANIZED HEALTH CARE EDUCATION/TRAINING PROGRAM
Payer: COMMERCIAL

## 2022-08-18 ENCOUNTER — TELEPHONE (OUTPATIENT)
Dept: INTERNAL MEDICINE | Facility: CLINIC | Age: 47
End: 2022-08-18
Payer: COMMERCIAL

## 2022-08-18 DIAGNOSIS — Z91.89 AT RISK FOR SIDE EFFECT OF MEDICATION: ICD-10-CM

## 2022-08-18 DIAGNOSIS — I10 ESSENTIAL HYPERTENSION: ICD-10-CM

## 2022-08-18 DIAGNOSIS — I10 PRIMARY HYPERTENSION: Primary | ICD-10-CM

## 2022-08-18 DIAGNOSIS — Z00.00 ANNUAL PHYSICAL EXAM: ICD-10-CM

## 2022-08-18 LAB
ALBUMIN SERPL BCP-MCNC: 3.9 G/DL (ref 3.5–5.2)
ALP SERPL-CCNC: 52 U/L (ref 55–135)
ALT SERPL W/O P-5'-P-CCNC: 21 U/L (ref 10–44)
ANION GAP SERPL CALC-SCNC: 10 MMOL/L (ref 8–16)
AST SERPL-CCNC: 17 U/L (ref 10–40)
BASOPHILS # BLD AUTO: 0.05 K/UL (ref 0–0.2)
BASOPHILS NFR BLD: 0.6 % (ref 0–1.9)
BILIRUB SERPL-MCNC: 0.5 MG/DL (ref 0.1–1)
BUN SERPL-MCNC: 20 MG/DL (ref 6–20)
CALCIUM SERPL-MCNC: 10.1 MG/DL (ref 8.7–10.5)
CHLORIDE SERPL-SCNC: 107 MMOL/L (ref 95–110)
CHOLEST SERPL-MCNC: 152 MG/DL (ref 120–199)
CHOLEST/HDLC SERPL: 4.3 {RATIO} (ref 2–5)
CO2 SERPL-SCNC: 23 MMOL/L (ref 23–29)
CREAT SERPL-MCNC: 0.8 MG/DL (ref 0.5–1.4)
DIFFERENTIAL METHOD: ABNORMAL
EOSINOPHIL # BLD AUTO: 0.1 K/UL (ref 0–0.5)
EOSINOPHIL NFR BLD: 1.1 % (ref 0–8)
ERYTHROCYTE [DISTWIDTH] IN BLOOD BY AUTOMATED COUNT: 12.9 % (ref 11.5–14.5)
EST. GFR  (NO RACE VARIABLE): >60 ML/MIN/1.73 M^2
ESTIMATED AVG GLUCOSE: 105 MG/DL (ref 68–131)
GLUCOSE SERPL-MCNC: 99 MG/DL (ref 70–110)
HBA1C MFR BLD: 5.3 % (ref 4–5.6)
HCT VFR BLD AUTO: 43.2 % (ref 37–48.5)
HDLC SERPL-MCNC: 35 MG/DL (ref 40–75)
HDLC SERPL: 23 % (ref 20–50)
HGB BLD-MCNC: 14.5 G/DL (ref 12–16)
IMM GRANULOCYTES # BLD AUTO: 0.02 K/UL (ref 0–0.04)
IMM GRANULOCYTES NFR BLD AUTO: 0.2 % (ref 0–0.5)
LDLC SERPL CALC-MCNC: 103.2 MG/DL (ref 63–159)
LYMPHOCYTES # BLD AUTO: 2.2 K/UL (ref 1–4.8)
LYMPHOCYTES NFR BLD: 27.5 % (ref 18–48)
MAGNESIUM SERPL-MCNC: 2 MG/DL (ref 1.6–2.6)
MCH RBC QN AUTO: 31.5 PG (ref 27–31)
MCHC RBC AUTO-ENTMCNC: 33.6 G/DL (ref 32–36)
MCV RBC AUTO: 94 FL (ref 82–98)
MONOCYTES # BLD AUTO: 0.7 K/UL (ref 0.3–1)
MONOCYTES NFR BLD: 9.2 % (ref 4–15)
NEUTROPHILS # BLD AUTO: 4.9 K/UL (ref 1.8–7.7)
NEUTROPHILS NFR BLD: 61.4 % (ref 38–73)
NONHDLC SERPL-MCNC: 117 MG/DL
NRBC BLD-RTO: 0 /100 WBC
PLATELET # BLD AUTO: 387 K/UL (ref 150–450)
PMV BLD AUTO: 10.6 FL (ref 9.2–12.9)
POTASSIUM SERPL-SCNC: 4 MMOL/L (ref 3.5–5.1)
PROT SERPL-MCNC: 7.5 G/DL (ref 6–8.4)
RBC # BLD AUTO: 4.61 M/UL (ref 4–5.4)
SODIUM SERPL-SCNC: 140 MMOL/L (ref 136–145)
TRIGL SERPL-MCNC: 69 MG/DL (ref 30–150)
TSH SERPL DL<=0.005 MIU/L-ACNC: 3.64 UIU/ML (ref 0.4–4)
WBC # BLD AUTO: 8.03 K/UL (ref 3.9–12.7)

## 2022-08-18 PROCEDURE — 83036 HEMOGLOBIN GLYCOSYLATED A1C: CPT | Performed by: STUDENT IN AN ORGANIZED HEALTH CARE EDUCATION/TRAINING PROGRAM

## 2022-08-18 PROCEDURE — 36415 COLL VENOUS BLD VENIPUNCTURE: CPT | Performed by: STUDENT IN AN ORGANIZED HEALTH CARE EDUCATION/TRAINING PROGRAM

## 2022-08-18 PROCEDURE — 83735 ASSAY OF MAGNESIUM: CPT | Performed by: STUDENT IN AN ORGANIZED HEALTH CARE EDUCATION/TRAINING PROGRAM

## 2022-08-18 PROCEDURE — 80061 LIPID PANEL: CPT | Performed by: STUDENT IN AN ORGANIZED HEALTH CARE EDUCATION/TRAINING PROGRAM

## 2022-08-18 PROCEDURE — 85025 COMPLETE CBC W/AUTO DIFF WBC: CPT | Performed by: STUDENT IN AN ORGANIZED HEALTH CARE EDUCATION/TRAINING PROGRAM

## 2022-08-18 PROCEDURE — 80053 COMPREHEN METABOLIC PANEL: CPT | Performed by: STUDENT IN AN ORGANIZED HEALTH CARE EDUCATION/TRAINING PROGRAM

## 2022-08-18 PROCEDURE — 84443 ASSAY THYROID STIM HORMONE: CPT | Performed by: STUDENT IN AN ORGANIZED HEALTH CARE EDUCATION/TRAINING PROGRAM

## 2022-08-19 RX ORDER — HYDROCHLOROTHIAZIDE 50 MG/1
50 TABLET ORAL DAILY
Qty: 90 TABLET | Refills: 1 | Status: SHIPPED | OUTPATIENT
Start: 2022-08-19 | End: 2022-10-31

## 2022-08-19 NOTE — TELEPHONE ENCOUNTER
No new care gaps identified.  [facility] Embedded Care Gaps. Reference number: [id number]. 8/19/2022   11:49:31 AM CDT

## 2022-08-24 ENCOUNTER — PATIENT MESSAGE (OUTPATIENT)
Dept: INTERNAL MEDICINE | Facility: CLINIC | Age: 47
End: 2022-08-24
Payer: COMMERCIAL

## 2022-09-02 ENCOUNTER — TELEPHONE (OUTPATIENT)
Dept: INTERNAL MEDICINE | Facility: CLINIC | Age: 47
End: 2022-09-02
Payer: COMMERCIAL

## 2022-09-02 NOTE — TELEPHONE ENCOUNTER
Jerry Quiles (Key: [id number]) - [id number]  Ozempic (0.25 or 0.5 MG/DOSE) 2MG/1.5ML pen-injectors       Status: PA Request    Created: September 2nd, 2022    Sent: September 2nd, 2022

## 2022-09-08 ENCOUNTER — PATIENT MESSAGE (OUTPATIENT)
Dept: INTERNAL MEDICINE | Facility: CLINIC | Age: 47
End: 2022-09-08
Payer: COMMERCIAL

## 2022-09-12 ENCOUNTER — PATIENT MESSAGE (OUTPATIENT)
Dept: INTERNAL MEDICINE | Facility: CLINIC | Age: 47
End: 2022-09-12
Payer: COMMERCIAL

## 2022-09-12 RX ORDER — TIRZEPATIDE 7.5 MG/.5ML
7.5 INJECTION, SOLUTION SUBCUTANEOUS
Qty: 4 PEN | Refills: 0 | Status: SHIPPED | OUTPATIENT
Start: 2022-09-12 | End: 2022-10-05

## 2022-09-13 ENCOUNTER — PATIENT OUTREACH (OUTPATIENT)
Dept: INTERNAL MEDICINE | Facility: CLINIC | Age: 47
End: 2022-09-13
Payer: COMMERCIAL

## 2022-09-14 ENCOUNTER — TELEPHONE (OUTPATIENT)
Dept: INTERNAL MEDICINE | Facility: CLINIC | Age: 47
End: 2022-09-14
Payer: COMMERCIAL

## 2022-09-23 ENCOUNTER — PATIENT MESSAGE (OUTPATIENT)
Dept: INTERNAL MEDICINE | Facility: CLINIC | Age: 47
End: 2022-09-23
Payer: COMMERCIAL

## 2022-09-29 ENCOUNTER — PATIENT MESSAGE (OUTPATIENT)
Dept: INTERNAL MEDICINE | Facility: CLINIC | Age: 47
End: 2022-09-29
Payer: COMMERCIAL

## 2022-09-29 VITALS — DIASTOLIC BLOOD PRESSURE: 75 MMHG | SYSTOLIC BLOOD PRESSURE: 105 MMHG

## 2022-10-03 ENCOUNTER — PATIENT MESSAGE (OUTPATIENT)
Dept: ADMINISTRATIVE | Facility: HOSPITAL | Age: 47
End: 2022-10-03
Payer: COMMERCIAL

## 2022-10-05 ENCOUNTER — PATIENT MESSAGE (OUTPATIENT)
Dept: INTERNAL MEDICINE | Facility: CLINIC | Age: 47
End: 2022-10-05
Payer: COMMERCIAL

## 2022-10-05 DIAGNOSIS — E66.09 OBESITY DUE TO EXCESS CALORIES WITH SERIOUS COMORBIDITY, UNSPECIFIED CLASSIFICATION: Primary | ICD-10-CM

## 2022-10-05 RX ORDER — TIRZEPATIDE 10 MG/.5ML
10 INJECTION, SOLUTION SUBCUTANEOUS
Qty: 4 PEN | Refills: 0 | Status: SHIPPED | OUTPATIENT
Start: 2022-10-05 | End: 2022-11-01

## 2022-10-05 RX ORDER — FLUTICASONE PROPIONATE 0.5 MG/G
CREAM TOPICAL 2 TIMES DAILY
Qty: 15 G | Refills: 1 | Status: SHIPPED | OUTPATIENT
Start: 2022-10-05 | End: 2023-05-22

## 2022-10-05 NOTE — TELEPHONE ENCOUNTER
No new care gaps identified.  [facility] Embedded Care Gaps. Reference number: [id number]. 10/05/2022   3:10:07 PM CDT

## 2022-10-10 ENCOUNTER — PATIENT MESSAGE (OUTPATIENT)
Dept: INTERNAL MEDICINE | Facility: CLINIC | Age: 47
End: 2022-10-10
Payer: COMMERCIAL

## 2022-10-17 ENCOUNTER — PATIENT MESSAGE (OUTPATIENT)
Dept: INTERNAL MEDICINE | Facility: CLINIC | Age: 47
End: 2022-10-17
Payer: COMMERCIAL

## 2022-10-18 ENCOUNTER — LAB VISIT (OUTPATIENT)
Dept: LAB | Facility: OTHER | Age: 47
End: 2022-10-18
Attending: STUDENT IN AN ORGANIZED HEALTH CARE EDUCATION/TRAINING PROGRAM
Payer: COMMERCIAL

## 2022-10-18 ENCOUNTER — PATIENT MESSAGE (OUTPATIENT)
Dept: INTERNAL MEDICINE | Facility: CLINIC | Age: 47
End: 2022-10-18
Payer: COMMERCIAL

## 2022-10-18 DIAGNOSIS — I10 PRIMARY HYPERTENSION: ICD-10-CM

## 2022-10-18 DIAGNOSIS — E87.6 HYPOKALEMIA: Primary | ICD-10-CM

## 2022-10-18 LAB
ANION GAP SERPL CALC-SCNC: 5 MMOL/L (ref 8–16)
BUN SERPL-MCNC: 15 MG/DL (ref 6–20)
CALCIUM SERPL-MCNC: 9.3 MG/DL (ref 8.7–10.5)
CHLORIDE SERPL-SCNC: 104 MMOL/L (ref 95–110)
CO2 SERPL-SCNC: 27 MMOL/L (ref 23–29)
CREAT SERPL-MCNC: 0.8 MG/DL (ref 0.5–1.4)
EST. GFR  (NO RACE VARIABLE): >60 ML/MIN/1.73 M^2
GLUCOSE SERPL-MCNC: 97 MG/DL (ref 70–110)
POTASSIUM SERPL-SCNC: 3.3 MMOL/L (ref 3.5–5.1)
SODIUM SERPL-SCNC: 136 MMOL/L (ref 136–145)

## 2022-10-18 PROCEDURE — 36415 COLL VENOUS BLD VENIPUNCTURE: CPT | Performed by: STUDENT IN AN ORGANIZED HEALTH CARE EDUCATION/TRAINING PROGRAM

## 2022-10-18 PROCEDURE — 80048 BASIC METABOLIC PNL TOTAL CA: CPT | Performed by: STUDENT IN AN ORGANIZED HEALTH CARE EDUCATION/TRAINING PROGRAM

## 2022-10-20 ENCOUNTER — TELEPHONE (OUTPATIENT)
Dept: INTERNAL MEDICINE | Facility: CLINIC | Age: 47
End: 2022-10-20
Payer: COMMERCIAL

## 2022-10-20 NOTE — TELEPHONE ENCOUNTER
Spoke to Pharmacy and informed them per Dr. Molina that Yes can take potassium along with losartan (and hydrochlorothiazide and chronically low potassium).

## 2022-10-20 NOTE — TELEPHONE ENCOUNTER
----- Message from Jennifer Ramos sent at 10/20/2022 10:53 AM CDT -----  Regarding: drug reaction  Contact: [phone]  Pt Questions    Who Called:[facility]   Questions:Calling to verify the pt can take the potassium along with the Losartan. Can be a verbal verification   Call Back number: [phone]

## 2022-10-21 ENCOUNTER — PATIENT MESSAGE (OUTPATIENT)
Dept: INTERNAL MEDICINE | Facility: CLINIC | Age: 47
End: 2022-10-21
Payer: COMMERCIAL

## 2022-10-21 RX ORDER — POTASSIUM CHLORIDE 20 MEQ/1
TABLET, EXTENDED RELEASE ORAL
Qty: 90 TABLET | Refills: 1 | Status: SHIPPED | OUTPATIENT
Start: 2022-10-21 | End: 2022-11-26

## 2022-10-28 ENCOUNTER — PATIENT MESSAGE (OUTPATIENT)
Dept: INTERNAL MEDICINE | Facility: CLINIC | Age: 47
End: 2022-10-28
Payer: COMMERCIAL

## 2022-10-28 DIAGNOSIS — E87.6 HYPOKALEMIA: Primary | ICD-10-CM

## 2022-10-30 NOTE — PROGRESS NOTES
[facility]    Subjective:       Patient ID: Jerry Quiles is a 47 y.o. female.    Chief Complaint: f/u No chief complaint on file.      History was obtained from the patient and supplemented through chart review.  This pt is known to me.    HPI:    Patient is a 47 y.o. female with chronic medical problems including vertigo, HTN, acquired hypothyroidism, obesity. Presents for annual.    HTN  Taking amlodipine 10, losartan 100 -> valsartan 320, coreg 6.25 BID, hydrochlorothiazide 50 -> 25 mg.  Controlled  Will follow-up on dose change, attempting to minimize number of pills  Nurse remote  Can switch to exforge if doses stabilize  Potassium better, will recheck in few weeks    Obesity  Continuing mounjaro  Losing small amounts of weight; working on diet and exercise    Not addressed  Hip pain   Originally right leg  Since April, intermittent  Now more left hip  Aching pain  No trauma  Pt requests occasional NSAID + antacid: duexis  Aware this is not long term plan  PT if fails to improve    Lives in [place], works in CBD at law firm.  Prefers to have care in [facility] due to decrease time away from work     Spin bike, has been exercising most nights    Wt Readings from Last 15 Encounters:   10/31/22 85.8 kg (189 lb 2.5 oz)   03/31/21 86.8 kg (191 lb 5.8 oz)   11/17/20 87.3 kg (192 lb 7.4 oz)   11/10/20 86.4 kg (190 lb 7.6 oz)   10/26/20 85.9 kg (189 lb 6 oz)          Medical History  Past Medical History:   Diagnosis Date    HTN (hypertension) 2016    Was off of BP meds after sleeve. did not take meds when at home from Covid    Vertigo 06/2020    In ICU at [facility], checked for stroke       Review of Systems   Constitutional:  Negative for fever.   HENT:  Negative for trouble swallowing.    Respiratory:  Negative for shortness of breath.    Cardiovascular:  Negative for chest pain.   Gastrointestinal:  Negative for constipation, diarrhea, nausea and vomiting.   Musculoskeletal:  Negative for gait problem.  "  Neurological:  Negative for dizziness and seizures.   Psychiatric/Behavioral:  Negative for hallucinations.        Surgical hx, family hx, social hx   No colon cancer, no breast cancer  Have been reviewed      Current Outpatient Medications:     amLODIPine (NORVASC) 10 MG tablet, TAKE 1 TABLET BY MOUTH ONCE DAILY, Disp: 90 tablet, Rfl: 1    carvediloL (COREG) 6.25 MG tablet, Take 1 tablet (6.25 mg total) by mouth 2 (two) times daily., Disp: 180 tablet, Rfl: 1    famotidine (PEPCID) 20 MG tablet, Take 1 tablet (20 mg total) by mouth nightly as needed (while taking meloxicam)., Disp: 30 tablet, Rfl: 3    fluticasone propionate (CUTIVATE) 0.05 % cream, Apply topically 2 (two) times daily., Disp: 15 g, Rfl: 1    ketoconazole (NIZORAL) 2 % cream, Apply topically 2 (two) times daily. buttock, Disp: 60 g, Rfl: 1    meloxicam (MOBIC) 7.5 MG tablet, Take 1 tablet (7.5 mg total) by mouth once daily., Disp: 30 tablet, Rfl: 1    pen needle, diabetic 32 gauge x 5/32" Ndle, 1 each by Misc.(Non-Drug; Combo Route) route once a week., Disp: 200 each, Rfl: 2    potassium chloride SA (K-DUR,KLOR-CON) 20 MEQ tablet, Take 1 tab 3 times a day for 3 days and then 1 tab daily., Disp: 90 tablet, Rfl: 1    tirzepatide (MOUNJARO) 10 mg/0.5 mL PnIj, Inject 10 mg into the skin every 7 days., Disp: 4 pen, Rfl: 0    clotrimazole (LOTRIMIN) 1 % cream, Apply to affected area 2 times daily, Disp: 30 g, Rfl: 1    hydroCHLOROthiazide (HYDRODIURIL) 25 MG tablet, Take 1 tablet (25 mg total) by mouth once daily., Disp: 30 tablet, Rfl: 3    valsartan (DIOVAN) 320 MG tablet, Take 1 tablet (320 mg total) by mouth once daily., Disp: 30 tablet, Rfl: 3    Objective:        Body mass index is 33.51 kg/m².  Vitals:    10/31/22 1558   BP: 112/84   Pulse: 84   SpO2: 98%   Weight: 85.8 kg (189 lb 2.5 oz)   Height: 5' 3" (1.6 m)   PainSc: 0-No pain     Physical Exam  Vitals and nursing note reviewed.   Constitutional:       General: She is not in acute distress.   "   Appearance: Normal appearance. She is not ill-appearing.   HENT:      Head: Normocephalic and atraumatic.   Eyes:      General: No scleral icterus.  Cardiovascular:      Rate and Rhythm: Normal rate and regular rhythm.      Heart sounds: Normal heart sounds.   Pulmonary:      Effort: Pulmonary effort is normal.   Musculoskeletal:         General: No deformity.      Cervical back: Normal range of motion.   Neurological:      Mental Status: She is alert and oriented to person, place, and time.   Psychiatric:         Behavior: Behavior normal.         Lab Results   Component Value Date    WBC 8.03 08/18/2022    HGB 14.5 08/18/2022    HCT 43.2 08/18/2022     08/18/2022    CHOL 152 08/18/2022    TRIG 69 08/18/2022    HDL 35 (L) 08/18/2022    ALT 21 08/18/2022    AST 17 08/18/2022     10/18/2022    K 3.8 10/31/2022     10/18/2022    CREATININE 0.8 10/18/2022    BUN 15 10/18/2022    CO2 27 10/18/2022    TSH 3.644 08/18/2022    HGBA1C 5.3 08/18/2022       The 10-year ASCVD risk score (Renee NOEL, et al., 2019) is: 1.2%    Values used to calculate the score:      Age: 47 years      Sex: Female      Is Non- : No      Diabetic: No      Tobacco smoker: No      Systolic Blood Pressure: 112 mmHg      Is BP treated: Yes      HDL Cholesterol: 35 mg/dL      Total Cholesterol: 152 mg/dL    (Imaging have been independently reviewed)  mmogram Birads 1     Assessment:         1. Annual physical exam    2. Primary hypertension    3. BMI 33.0-33.9,adult    4. Acquired hypothyroidism    5. Hypokalemia            Plan:     Jerry was seen today for establish care.    Diagnoses and all orders for this visit:    Annual physical exam    Primary hypertension  -     valsartan (DIOVAN) 320 MG tablet; Take 1 tablet (320 mg total) by mouth once daily.  -     hydroCHLOROthiazide (HYDRODIURIL) 25 MG tablet; Take 1 tablet (25 mg total) by mouth once daily.    BMI 33.0-33.9,adult    Acquired  hypothyroidism    Hypokalemia  -     Potassium; Future         Health Maintenance  - Lipids: ordered  - A1C: ordered  - Colon Ca Screen: needs,  Pt delaying, again asked about next steps.  Pt considering  - Immunizations:    Women's health  - Pap: follows with gyn in [place]. Request reqords  - Mammo: Birads 1 11/20; needs, scheduled today  - Dexa:  N/A   - Contraception: IUD    Follow up in about 3 months (around 1/31/2023).    needs labs ASAP  40-54 min were used in chart review, evaluation and counseling of patient, coordination of appt given loss of power in office and instrutions for virtual visit, documentation and review of results on same day of service      All medications were reviewed including potential side effects and risks/benefits.  Pt was counseled to call back if anything worsens or if questions arise.    Manav Molina MD  Family Medicine  [facility]  [address] [address]  Phone [phone]  Fax [phone]

## 2022-10-31 ENCOUNTER — PATIENT MESSAGE (OUTPATIENT)
Dept: INTERNAL MEDICINE | Facility: CLINIC | Age: 47
End: 2022-10-31

## 2022-10-31 ENCOUNTER — HOSPITAL ENCOUNTER (OUTPATIENT)
Dept: RADIOLOGY | Facility: OTHER | Age: 47
Discharge: HOME | End: 2022-10-31
Attending: STUDENT IN AN ORGANIZED HEALTH CARE EDUCATION/TRAINING PROGRAM
Payer: COMMERCIAL

## 2022-10-31 ENCOUNTER — OFFICE VISIT (OUTPATIENT)
Dept: INTERNAL MEDICINE | Facility: CLINIC | Age: 47
End: 2022-10-31
Payer: COMMERCIAL

## 2022-10-31 VITALS
OXYGEN SATURATION: 98 % | HEIGHT: 63 IN | HEART RATE: 84 BPM | DIASTOLIC BLOOD PRESSURE: 84 MMHG | BODY MASS INDEX: 33.51 KG/M2 | SYSTOLIC BLOOD PRESSURE: 112 MMHG | WEIGHT: 189.13 LBS

## 2022-10-31 DIAGNOSIS — Z12.31 OTHER SCREENING MAMMOGRAM: ICD-10-CM

## 2022-10-31 DIAGNOSIS — E87.6 HYPOKALEMIA: ICD-10-CM

## 2022-10-31 DIAGNOSIS — I10 PRIMARY HYPERTENSION: ICD-10-CM

## 2022-10-31 DIAGNOSIS — E03.9 ACQUIRED HYPOTHYROIDISM: ICD-10-CM

## 2022-10-31 DIAGNOSIS — Z00.00 ANNUAL PHYSICAL EXAM: Primary | ICD-10-CM

## 2022-10-31 PROCEDURE — 99396 PREV VISIT EST AGE 40-64: CPT | Mod: S$GLB,,, | Performed by: STUDENT IN AN ORGANIZED HEALTH CARE EDUCATION/TRAINING PROGRAM

## 2022-10-31 PROCEDURE — 1159F PR MEDICATION LIST DOCUMENTED IN MEDICAL RECORD: ICD-10-PCS | Mod: CPTII,S$GLB,, | Performed by: STUDENT IN AN ORGANIZED HEALTH CARE EDUCATION/TRAINING PROGRAM

## 2022-10-31 PROCEDURE — 77063 MAMMO DIGITAL SCREENING BILAT WITH TOMO: ICD-10-PCS | Mod: 26,,, | Performed by: RADIOLOGY

## 2022-10-31 PROCEDURE — 3044F HG A1C LEVEL LT 7.0%: CPT | Mod: CPTII,S$GLB,, | Performed by: STUDENT IN AN ORGANIZED HEALTH CARE EDUCATION/TRAINING PROGRAM

## 2022-10-31 PROCEDURE — 77067 MAMMO DIGITAL SCREENING BILAT WITH TOMO: ICD-10-PCS | Mod: 26,,, | Performed by: RADIOLOGY

## 2022-10-31 PROCEDURE — 77063 BREAST TOMOSYNTHESIS BI: CPT | Mod: 26,,, | Performed by: RADIOLOGY

## 2022-10-31 PROCEDURE — 77067 SCR MAMMO BI INCL CAD: CPT | Mod: TC

## 2022-10-31 PROCEDURE — 4010F ACE/ARB THERAPY RXD/TAKEN: CPT | Mod: CPTII,S$GLB,, | Performed by: STUDENT IN AN ORGANIZED HEALTH CARE EDUCATION/TRAINING PROGRAM

## 2022-10-31 PROCEDURE — 3044F PR MOST RECENT HEMOGLOBIN A1C LEVEL <7.0%: ICD-10-PCS | Mod: CPTII,S$GLB,, | Performed by: STUDENT IN AN ORGANIZED HEALTH CARE EDUCATION/TRAINING PROGRAM

## 2022-10-31 PROCEDURE — 99396 PR PREVENTIVE VISIT,EST,40-64: ICD-10-PCS | Mod: S$GLB,,, | Performed by: STUDENT IN AN ORGANIZED HEALTH CARE EDUCATION/TRAINING PROGRAM

## 2022-10-31 PROCEDURE — 3074F PR MOST RECENT SYSTOLIC BLOOD PRESSURE < 130 MM HG: ICD-10-PCS | Mod: CPTII,S$GLB,, | Performed by: STUDENT IN AN ORGANIZED HEALTH CARE EDUCATION/TRAINING PROGRAM

## 2022-10-31 PROCEDURE — 3079F PR MOST RECENT DIASTOLIC BLOOD PRESSURE 80-89 MM HG: ICD-10-PCS | Mod: CPTII,S$GLB,, | Performed by: STUDENT IN AN ORGANIZED HEALTH CARE EDUCATION/TRAINING PROGRAM

## 2022-10-31 PROCEDURE — 77063 BREAST TOMOSYNTHESIS BI: CPT | Mod: TC

## 2022-10-31 PROCEDURE — 99999 PR PBB SHADOW E&M-EST. PATIENT-LVL IV: ICD-10-PCS | Mod: PBBFAC,,, | Performed by: STUDENT IN AN ORGANIZED HEALTH CARE EDUCATION/TRAINING PROGRAM

## 2022-10-31 PROCEDURE — 77067 SCR MAMMO BI INCL CAD: CPT | Mod: 26,,, | Performed by: RADIOLOGY

## 2022-10-31 PROCEDURE — 99999 PR PBB SHADOW E&M-EST. PATIENT-LVL IV: CPT | Mod: PBBFAC,,, | Performed by: STUDENT IN AN ORGANIZED HEALTH CARE EDUCATION/TRAINING PROGRAM

## 2022-10-31 PROCEDURE — 3079F DIAST BP 80-89 MM HG: CPT | Mod: CPTII,S$GLB,, | Performed by: STUDENT IN AN ORGANIZED HEALTH CARE EDUCATION/TRAINING PROGRAM

## 2022-10-31 PROCEDURE — 1159F MED LIST DOCD IN RCRD: CPT | Mod: CPTII,S$GLB,, | Performed by: STUDENT IN AN ORGANIZED HEALTH CARE EDUCATION/TRAINING PROGRAM

## 2022-10-31 PROCEDURE — 4010F PR ACE/ARB THEARPY RXD/TAKEN: ICD-10-PCS | Mod: CPTII,S$GLB,, | Performed by: STUDENT IN AN ORGANIZED HEALTH CARE EDUCATION/TRAINING PROGRAM

## 2022-10-31 PROCEDURE — 3074F SYST BP LT 130 MM HG: CPT | Mod: CPTII,S$GLB,, | Performed by: STUDENT IN AN ORGANIZED HEALTH CARE EDUCATION/TRAINING PROGRAM

## 2022-10-31 RX ORDER — HYDROCHLOROTHIAZIDE 25 MG/1
25 TABLET ORAL DAILY
Qty: 30 TABLET | Refills: 3 | Status: SHIPPED | OUTPATIENT
Start: 2022-10-31 | End: 2022-11-26

## 2022-10-31 RX ORDER — VALSARTAN 320 MG/1
320 TABLET ORAL DAILY
Qty: 30 TABLET | Refills: 3 | Status: SHIPPED | OUTPATIENT
Start: 2022-10-31 | End: 2023-05-08

## 2022-10-31 NOTE — PATIENT INSTRUCTIONS
-Stop losartan.  Instead take valsartan (which is stronger)  -Decrease hydrochlorothiazide from 50 mg to 25 mg (ok to take 1/2 pill)

## 2022-11-01 ENCOUNTER — PATIENT MESSAGE (OUTPATIENT)
Dept: INTERNAL MEDICINE | Facility: CLINIC | Age: 47
End: 2022-11-01
Payer: COMMERCIAL

## 2022-11-01 DIAGNOSIS — E66.9 OBESITY (BMI 30.0-34.9): ICD-10-CM

## 2022-11-01 DIAGNOSIS — Z71.3 ENCOUNTER FOR WEIGHT LOSS COUNSELING: Primary | ICD-10-CM

## 2022-11-01 RX ORDER — TIRZEPATIDE 12.5 MG/.5ML
12.5 INJECTION, SOLUTION SUBCUTANEOUS
Qty: 4 PEN | Refills: 0 | Status: SHIPPED | OUTPATIENT
Start: 2022-11-01 | End: 2022-11-30

## 2022-11-11 ENCOUNTER — PATIENT MESSAGE (OUTPATIENT)
Dept: INTERNAL MEDICINE | Facility: CLINIC | Age: 47
End: 2022-11-11
Payer: COMMERCIAL

## 2022-11-11 VITALS — DIASTOLIC BLOOD PRESSURE: 84 MMHG | SYSTOLIC BLOOD PRESSURE: 105 MMHG

## 2022-11-11 RX ORDER — MECLIZINE HCL 12.5 MG 12.5 MG/1
12.5 TABLET ORAL 3 TIMES DAILY PRN
Qty: 60 TABLET | Refills: 0 | Status: SHIPPED | OUTPATIENT
Start: 2022-11-11

## 2022-11-11 NOTE — TELEPHONE ENCOUNTER
No new care gaps identified.  [facility] Embedded Care Gaps. Reference number: [id number]. 11/11/2022   9:48:54 AM CST

## 2022-11-14 ENCOUNTER — PATIENT MESSAGE (OUTPATIENT)
Dept: INTERNAL MEDICINE | Facility: CLINIC | Age: 47
End: 2022-11-14
Payer: COMMERCIAL

## 2022-11-21 ENCOUNTER — TELEPHONE (OUTPATIENT)
Dept: INTERNAL MEDICINE | Facility: CLINIC | Age: 47
End: 2022-11-21

## 2022-11-21 ENCOUNTER — PATIENT MESSAGE (OUTPATIENT)
Dept: INTERNAL MEDICINE | Facility: CLINIC | Age: 47
End: 2022-11-21

## 2022-11-21 ENCOUNTER — CLINICAL SUPPORT (OUTPATIENT)
Dept: INTERNAL MEDICINE | Facility: CLINIC | Age: 47
End: 2022-11-21
Payer: COMMERCIAL

## 2022-11-21 VITALS — SYSTOLIC BLOOD PRESSURE: 110 MMHG | DIASTOLIC BLOOD PRESSURE: 85 MMHG

## 2022-11-21 VITALS — DIASTOLIC BLOOD PRESSURE: 70 MMHG | SYSTOLIC BLOOD PRESSURE: 92 MMHG | HEART RATE: 100 BPM

## 2022-11-21 PROCEDURE — 99999 PR PBB SHADOW E&M-EST. PATIENT-LVL III: CPT | Mod: PBBFAC,,,

## 2022-11-21 PROCEDURE — 99999 PR PBB SHADOW E&M-EST. PATIENT-LVL III: ICD-10-PCS | Mod: PBBFAC,,,

## 2022-11-21 NOTE — TELEPHONE ENCOUNTER
Jerry Quiles 47 y.o. female is here for Blood Pressure check. remote    Pt's Home blood pressure machine read 92/70, Pulse 100.     If high, was it repeated after 15 minutes? no    Diagnosed with Hypertension yes.    Patient took blood pressure medication today yes.  Last dose of blood pressure medication was taken at 1000. Patient took Amlodipine, Carvedilol, Valsartan.     All Medications and OTC medication updated yes    Does patient have record of home blood pressure readings / Blood Pressure Log yes. Averages: 115/90; 119/82; 125/87.     Does the pt have any complaints today in regards to their blood pressure medication? no. Complains of N/A. Patient is asymptomatic.     Were you sitting still for 5-10 minutes prior to taking your Blood pressure? yes     Has your blood pressure monitor ever been checked? no When was last time we checked your blood pressure monitor? Never    Updated vitals yes    Follow up date is 1/31/2023.     Dr. Molina notified.     Creatinine   Date Value Ref Range Status   10/18/2022 0.8 0.5 - 1.4 mg/dL Final     Sodium   Date Value Ref Range Status   10/18/2022 136 136 - 145 mmol/L Final     Potassium   Date Value Ref Range Status   10/31/2022 3.8 3.5 - 5.1 mmol/L Final

## 2022-11-22 ENCOUNTER — PATIENT MESSAGE (OUTPATIENT)
Dept: INTERNAL MEDICINE | Facility: CLINIC | Age: 47
End: 2022-11-22
Payer: COMMERCIAL

## 2022-11-22 DIAGNOSIS — I10 BENIGN ESSENTIAL HTN: Chronic | ICD-10-CM

## 2022-11-26 RX ORDER — AMLODIPINE BESYLATE 5 MG/1
10 TABLET ORAL DAILY
Qty: 90 TABLET | Refills: 1
Start: 2022-11-26 | End: 2022-12-28 | Stop reason: SDUPTHER

## 2022-11-26 NOTE — TELEPHONE ENCOUNTER
Talked to pt 2:17 PM 11/26/2022     Taking coreg 6.25 daily, advised needs to be BID to work for 24 hour coverage  Stopped hctz  Decreased amlodipine from 10 to 5   Cont valsartan 320 mg    Stopped potassium.  Last was 3.8    She will message with BP this week.    JL

## 2022-11-30 ENCOUNTER — PATIENT MESSAGE (OUTPATIENT)
Dept: INTERNAL MEDICINE | Facility: CLINIC | Age: 47
End: 2022-11-30
Payer: COMMERCIAL

## 2022-11-30 VITALS — DIASTOLIC BLOOD PRESSURE: 83 MMHG | SYSTOLIC BLOOD PRESSURE: 119 MMHG

## 2022-11-30 DIAGNOSIS — E66.01 CLASS 2 SEVERE OBESITY DUE TO EXCESS CALORIES WITH SERIOUS COMORBIDITY AND BODY MASS INDEX (BMI) OF 35.0 TO 35.9 IN ADULT: Primary | ICD-10-CM

## 2022-11-30 DIAGNOSIS — E66.09 OBESITY DUE TO EXCESS CALORIES WITH SERIOUS COMORBIDITY, UNSPECIFIED CLASSIFICATION: ICD-10-CM

## 2022-11-30 DIAGNOSIS — E66.9 OBESITY (BMI 30.0-34.9): ICD-10-CM

## 2022-11-30 DIAGNOSIS — Z71.3 ENCOUNTER FOR WEIGHT LOSS COUNSELING: ICD-10-CM

## 2022-11-30 RX ORDER — TIRZEPATIDE 15 MG/.5ML
15 INJECTION, SOLUTION SUBCUTANEOUS
Qty: 4 PEN | Refills: 0 | Status: SHIPPED | OUTPATIENT
Start: 2022-11-30 | End: 2022-12-28 | Stop reason: SDUPTHER

## 2022-11-30 RX ORDER — TIRZEPATIDE 12.5 MG/.5ML
12.5 INJECTION, SOLUTION SUBCUTANEOUS
Qty: 4 PEN | Refills: 0 | Status: CANCELLED | OUTPATIENT
Start: 2022-11-30

## 2022-11-30 NOTE — TELEPHONE ENCOUNTER
No new care gaps identified.  [facility] Embedded Care Gaps. Reference number: [id number]. 11/30/2022   10:40:09 AM CST

## 2022-12-06 ENCOUNTER — PATIENT MESSAGE (OUTPATIENT)
Dept: INTERNAL MEDICINE | Facility: CLINIC | Age: 47
End: 2022-12-06
Payer: COMMERCIAL

## 2022-12-06 VITALS — DIASTOLIC BLOOD PRESSURE: 78 MMHG | SYSTOLIC BLOOD PRESSURE: 110 MMHG

## 2022-12-28 DIAGNOSIS — I10 BENIGN ESSENTIAL HTN: Chronic | ICD-10-CM

## 2022-12-28 DIAGNOSIS — Z71.3 ENCOUNTER FOR WEIGHT LOSS COUNSELING: ICD-10-CM

## 2022-12-28 DIAGNOSIS — E66.9 OBESITY (BMI 30.0-34.9): ICD-10-CM

## 2022-12-28 NOTE — TELEPHONE ENCOUNTER
No new care gaps identified.  [facility] Embedded Care Gaps. Reference number: [id number]. 12/28/2022   4:19:49 PM CST

## 2022-12-29 RX ORDER — TIRZEPATIDE 15 MG/.5ML
15 INJECTION, SOLUTION SUBCUTANEOUS
Qty: 4 PEN | Refills: 11 | Status: SHIPPED | OUTPATIENT
Start: 2022-12-29 | End: 2023-01-31

## 2022-12-29 RX ORDER — AMLODIPINE BESYLATE 5 MG/1
10 TABLET ORAL DAILY
Qty: 90 TABLET | Refills: 1 | Status: SHIPPED | OUTPATIENT
Start: 2022-12-29 | End: 2023-01-31

## 2022-12-29 NOTE — TELEPHONE ENCOUNTER
Patient requesting refill on amLODIPine (NORVASC) 5 MG tablet and tirzepatide (MOUNJARO) 15 mg/0.5 mL . According to [facility] patient last office visit with provider was on 10/31/22 and the last refill date for the tirzepatide (MOUNJARO) was on 11/30/22.The last refill date for amLODIPine (NORVASC) was on 11/26/22.

## 2023-01-24 ENCOUNTER — PATIENT MESSAGE (OUTPATIENT)
Dept: INTERNAL MEDICINE | Facility: CLINIC | Age: 48
End: 2023-01-24
Payer: COMMERCIAL

## 2023-01-25 DIAGNOSIS — E66.9 OBESITY (BMI 30.0-34.9): ICD-10-CM

## 2023-01-25 DIAGNOSIS — Z71.3 ENCOUNTER FOR WEIGHT LOSS COUNSELING: ICD-10-CM

## 2023-01-25 NOTE — TELEPHONE ENCOUNTER
Spoke to patient and informed patient that prior authorization on file was  and close. Patient requested a new prior authorization. PA has been sent to plan via cover my meds per patient request. Thanks.       tirzepatide (MOUNJARO) 15 mg/0.5 mL PnIj 4 pen 11 2022  No   Sig - Route: Inject 15 mg into the skin every 7 days. - Subcutaneous   Sent to pharmacy as: tirzepatide (MOUNJARO) 15 mg/0.5 mL PnIj   Class: Normal   Order: [id number]   Date/Time Signed: 2022 09:13       E-Prescribing Status: Receipt confirmed by pharmacy (2022  9:13 AM CST)   No prior authorization was found for this prescription.   Found prior authorization for another prescription for the same medication: Closed - Other     Key [id number]   Pharmacy:   [id number] [place] [address]

## 2023-01-30 ENCOUNTER — PATIENT MESSAGE (OUTPATIENT)
Dept: INTERNAL MEDICINE | Facility: CLINIC | Age: 48
End: 2023-01-30
Payer: COMMERCIAL

## 2023-01-31 ENCOUNTER — PATIENT MESSAGE (OUTPATIENT)
Dept: INTERNAL MEDICINE | Facility: CLINIC | Age: 48
End: 2023-01-31

## 2023-01-31 ENCOUNTER — OFFICE VISIT (OUTPATIENT)
Dept: INTERNAL MEDICINE | Facility: CLINIC | Age: 48
End: 2023-01-31
Payer: COMMERCIAL

## 2023-01-31 VITALS — DIASTOLIC BLOOD PRESSURE: 90 MMHG | SYSTOLIC BLOOD PRESSURE: 135 MMHG

## 2023-01-31 DIAGNOSIS — I10 HYPERTENSION, UNSPECIFIED TYPE: Primary | ICD-10-CM

## 2023-01-31 DIAGNOSIS — E66.01 CLASS 2 SEVERE OBESITY DUE TO EXCESS CALORIES WITH SERIOUS COMORBIDITY AND BODY MASS INDEX (BMI) OF 35.0 TO 35.9 IN ADULT: ICD-10-CM

## 2023-01-31 DIAGNOSIS — I10 HYPERTENSION, UNSPECIFIED TYPE: ICD-10-CM

## 2023-01-31 DIAGNOSIS — I10 BENIGN ESSENTIAL HTN: Chronic | ICD-10-CM

## 2023-01-31 DIAGNOSIS — Z12.11 SCREENING FOR MALIGNANT NEOPLASM OF COLON: ICD-10-CM

## 2023-01-31 PROBLEM — E66.812 CLASS 2 SEVERE OBESITY DUE TO EXCESS CALORIES WITH SERIOUS COMORBIDITY AND BODY MASS INDEX (BMI) OF 35.0 TO 35.9 IN ADULT: Status: ACTIVE | Noted: 2023-01-31

## 2023-01-31 PROCEDURE — 99214 OFFICE O/P EST MOD 30 MIN: CPT | Mod: 95,,, | Performed by: STUDENT IN AN ORGANIZED HEALTH CARE EDUCATION/TRAINING PROGRAM

## 2023-01-31 PROCEDURE — 99214 PR OFFICE/OUTPT VISIT, EST, LEVL IV, 30-39 MIN: ICD-10-PCS | Mod: 95,,, | Performed by: STUDENT IN AN ORGANIZED HEALTH CARE EDUCATION/TRAINING PROGRAM

## 2023-01-31 PROCEDURE — 3080F DIAST BP >= 90 MM HG: CPT | Mod: CPTII,95,, | Performed by: STUDENT IN AN ORGANIZED HEALTH CARE EDUCATION/TRAINING PROGRAM

## 2023-01-31 PROCEDURE — 3080F PR MOST RECENT DIASTOLIC BLOOD PRESSURE >= 90 MM HG: ICD-10-PCS | Mod: CPTII,95,, | Performed by: STUDENT IN AN ORGANIZED HEALTH CARE EDUCATION/TRAINING PROGRAM

## 2023-01-31 PROCEDURE — 3075F SYST BP GE 130 - 139MM HG: CPT | Mod: CPTII,95,, | Performed by: STUDENT IN AN ORGANIZED HEALTH CARE EDUCATION/TRAINING PROGRAM

## 2023-01-31 PROCEDURE — 3075F PR MOST RECENT SYSTOLIC BLOOD PRESS GE 130-139MM HG: ICD-10-PCS | Mod: CPTII,95,, | Performed by: STUDENT IN AN ORGANIZED HEALTH CARE EDUCATION/TRAINING PROGRAM

## 2023-01-31 RX ORDER — AMLODIPINE BESYLATE 5 MG/1
5 TABLET ORAL DAILY
Qty: 90 TABLET | Refills: 1 | Status: SHIPPED | OUTPATIENT
Start: 2023-01-31 | End: 2024-03-15 | Stop reason: SDUPTHER

## 2023-01-31 RX ORDER — SEMAGLUTIDE 2.4 MG/.75ML
2.4 INJECTION, SOLUTION SUBCUTANEOUS
Qty: 3 ML | Refills: 6 | Status: SHIPPED | OUTPATIENT
Start: 2023-01-31 | End: 2023-02-07

## 2023-01-31 NOTE — TELEPHONE ENCOUNTER
Prior authorization for patient medication listed below was denied via cover my meds. Routed to Dr. Molina for alternative medication. Thanks.       tirzepatide (MOUNJARO) 15 mg/0.5 mL PnIj 4 pen 11 12/29/2022  No   Sig - Route: Inject 15 mg into the skin every 7 days. - Subcutaneous   Sent to pharmacy as: tirzepatide (MOUNJARO) 15 mg/0.5 mL PnIj   Class: Normal   Order: [id number]   Date/Time Signed: 12/29/2022 09:13       E-Prescribing Status: Receipt confirmed by pharmacy (12/29/2022  9:13 AM CST)   No prior authorization was found for this prescription.   Found prior authorization for another prescription for the same medication: Closed - Other

## 2023-01-31 NOTE — PROGRESS NOTES
The patient location is: work  The chief complaint leading to consultation is: HTN, obesity    Visit type: audiovisual    Face to Face time with patient: 3 (AV equip malfunction)  27 minutes of total time spent on the encounter, which includes face to face time and non-face to face time preparing to see the patient (eg, review of tests), Obtaining and/or reviewing separately obtained history, Documenting clinical information in the electronic or other health record, Independently interpreting results (not separately reported) and communicating results to the patient/family/caregiver, or Care coordination (not separately reported).         Each patient to whom he or she provides medical services by telemedicine is:  (1) informed of the relationship between the physician and patient and the respective role of any other health care provider with respect to management of the patient; and (2) notified that he or she may decline to receive medical services by telemedicine and may withdraw from such care at any time.    Notes:       [facility]    Subjective:       Patient ID: Jerry Quiles is a 47 y.o. female.    Chief Complaint:  Hypertension (Mounjaro f/u for obesity and weight loss)      History was obtained from the patient and supplemented through chart review.  This pt is known to me.    HPI:    Patient is a 47 y.o. female with chronic medical problems including vertigo, HTN, acquired hypothyroidism, obesity. Presents for f/u weight, BP.    HTN  Taking amlodipine 5 (instead of 10), valsartan 320, coreg 6.25 BID, hydrochlorothiazide   Controlled  135-138/90  Recommended home BP log  Nurse remote 2 weeks  Was considering switch to exforge if doses stabilize  Potassium better, stopped hxtz, recheck    Obesity  Mounjaro no longer covered with coupon, attempt to switch to wegovy  Down to 154 lbs on home scale  Counseled on healthy diet, exercise    Hip pain   Originally right leg  Since April,  intermittent  Now more left hip  Aching pain  No trauma  Pt requests occasional NSAID + antacid: duexis  Aware this is not long term plan  PT if fails to improve    Lives in [place], works in [employer] at [employer].  Prefers to have care in [facility] due to decrease time away from work     Spin bike, has been exercising most nights    Wt Readings from Last 15 Encounters:   10/31/22 85.8 kg (189 lb 2.5 oz)   03/31/21 86.8 kg (191 lb 5.8 oz)   11/17/20 87.3 kg (192 lb 7.4 oz)   11/10/20 86.4 kg (190 lb 7.6 oz)   10/26/20 85.9 kg (189 lb 6 oz)          Medical History  Past Medical History:   Diagnosis Date    HTN (hypertension) 2016    Was off of BP meds after sleeve. did not take meds when at home from Covid    Vertigo 06/2020    In ICU at [facility], checked for stroke       Review of Systems   Constitutional:  Negative for activity change and unexpected weight change.   HENT:  Negative for hearing loss, rhinorrhea and trouble swallowing.    Eyes:  Negative for discharge and visual disturbance.   Respiratory:  Negative for chest tightness and wheezing.    Cardiovascular:  Negative for chest pain and palpitations.   Gastrointestinal:  Negative for blood in stool, constipation, diarrhea and vomiting.   Endocrine: Negative for polydipsia and polyuria.   Genitourinary:  Negative for difficulty urinating, dysuria, hematuria and menstrual problem.   Musculoskeletal:  Negative for arthralgias, joint swelling and neck pain.   Neurological:  Negative for weakness and headaches.   Psychiatric/Behavioral:  Negative for confusion and dysphoric mood.        Surgical hx, family hx, social hx   No colon cancer, no breast cancer  Have been reviewed      Current Outpatient Medications:     amLODIPine (NORVASC) 5 MG tablet, Take 1 tablet (5 mg total) by mouth once daily., Disp: 90 tablet, Rfl: 1    carvediloL (COREG) 6.25 MG tablet, Take 1 tablet (6.25 mg total) by mouth 2 (two) times daily., Disp: 180 tablet, Rfl: 1    clotrimazole (LOTRIMIN) 1 %  "cream, Apply to affected area 2 times daily, Disp: 30 g, Rfl: 1    famotidine (PEPCID) 20 MG tablet, Take 1 tablet (20 mg total) by mouth nightly as needed (while taking meloxicam)., Disp: 30 tablet, Rfl: 3    fluticasone propionate (CUTIVATE) 0.05 % cream, Apply topically 2 (two) times daily., Disp: 15 g, Rfl: 1    ketoconazole (NIZORAL) 2 % cream, Apply topically 2 (two) times daily. buttock (Patient not taking: Reported on 11/21/2022), Disp: 60 g, Rfl: 1    meclizine (ANTIVERT) 12.5 mg tablet, Take 1 tablet (12.5 mg total) by mouth 3 (three) times daily as needed., Disp: 60 tablet, Rfl: 0    meloxicam (MOBIC) 7.5 MG tablet, Take 1 tablet (7.5 mg total) by mouth once daily., Disp: 30 tablet, Rfl: 1    pen needle, diabetic 32 gauge x 5/32" Ndle, 1 each by Misc.(Non-Drug; Combo Route) route once a week., Disp: 200 each, Rfl: 2    semaglutide, weight loss, (WEGOVY) 2.4 mg/0.75 mL PnIj, Inject 2.4 mg into the skin every 7 days., Disp: 3 mL, Rfl: 6    valsartan (DIOVAN) 320 MG tablet, Take 1 tablet (320 mg total) by mouth once daily., Disp: 30 tablet, Rfl: 3    Objective:        There is no height or weight on file to calculate BMI.  Vitals:    01/31/23 1340   BP: (!) 135/90       Physical Exam  Vitals and nursing note reviewed.   Constitutional:       General: She is not in acute distress.     Appearance: Normal appearance. She is not ill-appearing.   HENT:      Head: Normocephalic and atraumatic.   Eyes:      General: No scleral icterus.  Cardiovascular:      Rate and Rhythm: Normal rate and regular rhythm.      Heart sounds: Normal heart sounds.   Pulmonary:      Effort: Pulmonary effort is normal.   Musculoskeletal:         General: No deformity.      Cervical back: Normal range of motion.   Neurological:      Mental Status: She is alert and oriented to person, place, and time.   Psychiatric:         Behavior: Behavior normal.         Lab Results   Component Value Date    WBC 8.03 08/18/2022    HGB 14.5 08/18/2022 "    HCT 43.2 08/18/2022     08/18/2022    CHOL 152 08/18/2022    TRIG 69 08/18/2022    HDL 35 (L) 08/18/2022    ALT 21 08/18/2022    AST 17 08/18/2022     10/18/2022    K 3.8 10/31/2022     10/18/2022    CREATININE 0.8 10/18/2022    BUN 15 10/18/2022    CO2 27 10/18/2022    TSH 3.644 08/18/2022    HGBA1C 5.3 08/18/2022       The 10-year ASCVD risk score (Renee NOEL, et al., 2019) is: 1.8%    Values used to calculate the score:      Age: 47 years      Sex: Female      Is Non- : No      Diabetic: No      Tobacco smoker: No      Systolic Blood Pressure: 135 mmHg      Is BP treated: Yes      HDL Cholesterol: 35 mg/dL      Total Cholesterol: 152 mg/dL    (Imaging have been independently reviewed)  mmogram Birads 1     Assessment:         1. Hypertension, unspecified type    2. Class 2 severe obesity due to excess calories with serious comorbidity and body mass index (BMI) of 35.0 to 35.9 in adult    3. Benign essential HTN    4. Screening for malignant neoplasm of colon              Plan:     Jerry was seen today for establish care.    Jerry was seen today for hypertension.    Diagnoses and all orders for this visit:    Hypertension, unspecified type  -     semaglutide, weight loss, (WEGOVY) 2.4 mg/0.75 mL PnIj; Inject 2.4 mg into the skin every 7 days.    Class 2 severe obesity due to excess calories with serious comorbidity and body mass index (BMI) of 35.0 to 35.9 in adult  -     semaglutide, weight loss, (WEGOVY) 2.4 mg/0.75 mL PnIj; Inject 2.4 mg into the skin every 7 days.    Benign essential HTN  Comments:  Improved  Cont meds, increase dose of HCTZ  Orders:  -     amLODIPine (NORVASC) 5 MG tablet; Take 1 tablet (5 mg total) by mouth once daily.    Screening for malignant neoplasm of colon  -     Cologuard Screening (Multitarget Stool DNA); Future  -     Cologuard Screening (Multitarget Stool DNA)             Health Maintenance  - Lipids: ordered  - A1C: ordered  - Colon Ca  Screen: had been delaying; ordering cologuard  - Immunizations:    Women's health  - Pap: follows with gyn in [place]. Request records again.  Not seen in care everywhere despite that Gyn's records being present  - Mammo: Birads 1 10/31/2022  - Dexa:  N/A   - Contraception: IUD    Follow up in about 3 months (around 4/30/2023).         All medications were reviewed including potential side effects and risks/benefits.  Pt was counseled to call back if anything worsens or if questions arise.    Manav Molina MD  Family Medicine  [facility]  [address] [address]  Phone [phone]  Fax [phone]

## 2023-02-03 ENCOUNTER — TELEPHONE (OUTPATIENT)
Dept: INTERNAL MEDICINE | Facility: CLINIC | Age: 48
End: 2023-02-03
Payer: COMMERCIAL

## 2023-02-03 NOTE — TELEPHONE ENCOUNTER
Prior authorization has been started and completed for medication listed below through cover my meds. Sent to plan awaiting approval or denial. Thanks     Key [id number]     semaglutide, weight loss, (WEGOVY) 2.4 mg/0.75 mL PnIj 3 mL 6 1/31/2023  No   Sig - Route: Inject 2.4 mg into the skin every 7 days. - Subcutaneous   Sent to pharmacy as: semaglutide, weight loss, (WEGOVY) 2.4 mg/0.75 mL PnIj   Class: Normal   Order: [id number]   Date/Time Signed: 1/31/2023 13:49       E-Prescribing Status: Receipt confirmed by pharmacy (1/31/2023  1:50 PM CST)     Pharmacy    [id number] - [place] - [address]    Associated Diagnoses    Hypertension, unspecified type  - Primary       Class 2 severe obesity due to excess calories with serious comorbidity and body mass index (BMI) of 35.0 to 35.9 in adult

## 2023-02-03 NOTE — TELEPHONE ENCOUNTER
Can you please inform what needs to be done here besides starting a PA I am a bit confused please refer to my chart message below and advise. Thanks.     Routed to CHRISTINE Curiel for further instructions.       Raquel,     Can you please send a PA to [facility] for the Wegovy so that I can have it filled when the time comes?  It takes a while for them to get it in.  I know they do not cover it but the pharmacy needs this to use the  savings card.  Please let myself and [facility] know when we get a response.  Thank you.

## 2023-02-03 NOTE — TELEPHONE ENCOUNTER
Complete the PA and let the pharmacy know if it was approved or denied so that they can submit with the coupon card.

## 2023-02-07 ENCOUNTER — PATIENT MESSAGE (OUTPATIENT)
Dept: INTERNAL MEDICINE | Facility: CLINIC | Age: 48
End: 2023-02-07
Payer: COMMERCIAL

## 2023-02-07 ENCOUNTER — TELEPHONE (OUTPATIENT)
Dept: INTERNAL MEDICINE | Facility: CLINIC | Age: 48
End: 2023-02-07
Payer: COMMERCIAL

## 2023-02-07 DIAGNOSIS — E66.01 CLASS 2 SEVERE OBESITY DUE TO EXCESS CALORIES WITH SERIOUS COMORBIDITY AND BODY MASS INDEX (BMI) OF 35.0 TO 35.9 IN ADULT: ICD-10-CM

## 2023-02-13 ENCOUNTER — PATIENT MESSAGE (OUTPATIENT)
Dept: INTERNAL MEDICINE | Facility: CLINIC | Age: 48
End: 2023-02-13
Payer: COMMERCIAL

## 2023-02-13 VITALS — SYSTOLIC BLOOD PRESSURE: 108 MMHG | DIASTOLIC BLOOD PRESSURE: 81 MMHG

## 2023-02-16 ENCOUNTER — PATIENT MESSAGE (OUTPATIENT)
Dept: INTERNAL MEDICINE | Facility: CLINIC | Age: 48
End: 2023-02-16
Payer: COMMERCIAL

## 2023-02-17 ENCOUNTER — TELEPHONE (OUTPATIENT)
Dept: INTERNAL MEDICINE | Facility: CLINIC | Age: 48
End: 2023-02-17
Payer: COMMERCIAL

## 2023-02-17 NOTE — TELEPHONE ENCOUNTER
----- Message from Awilda Garner sent at 2/17/2023  2:16 PM CST -----  Regarding: prior auth  Name of Who is Calling: Cover My Meds/  Elizabeth   [phone]            What is the request in detail:  Elizabeth says that they need a prior auth for pt's Wegovy     Ref key [id number]      Can the clinic reply by [facility]:          What Number to Call Back if not in [facility]:

## 2023-02-17 NOTE — TELEPHONE ENCOUNTER
""Prior authorization has been started and completed for medication listed below through cover my meds. Sent to plan awaiting approval or denial. Thanks" Cynthia.    "

## 2023-02-23 ENCOUNTER — PATIENT MESSAGE (OUTPATIENT)
Dept: INTERNAL MEDICINE | Facility: CLINIC | Age: 48
End: 2023-02-23
Payer: COMMERCIAL

## 2023-02-23 DIAGNOSIS — E66.01 CLASS 2 SEVERE OBESITY DUE TO EXCESS CALORIES WITH SERIOUS COMORBIDITY AND BODY MASS INDEX (BMI) OF 35.0 TO 35.9 IN ADULT: ICD-10-CM

## 2023-02-23 DIAGNOSIS — B00.9 HSV INFECTION: Primary | ICD-10-CM

## 2023-02-24 ENCOUNTER — PATIENT MESSAGE (OUTPATIENT)
Dept: INTERNAL MEDICINE | Facility: CLINIC | Age: 48
End: 2023-02-24
Payer: COMMERCIAL

## 2023-02-24 RX ORDER — VALACYCLOVIR HYDROCHLORIDE 1 G/1
2000 TABLET, FILM COATED ORAL 2 TIMES DAILY
Qty: 30 TABLET | Refills: 0 | Status: SHIPPED | OUTPATIENT
Start: 2023-02-24 | End: 2024-01-02 | Stop reason: SDUPTHER

## 2023-02-24 RX ORDER — TIRZEPATIDE 12.5 MG/.5ML
12.5 INJECTION, SOLUTION SUBCUTANEOUS
Qty: 12 PEN | Refills: 3 | Status: SHIPPED | OUTPATIENT
Start: 2023-02-24 | End: 2023-03-20

## 2023-03-16 ENCOUNTER — TELEPHONE (OUTPATIENT)
Dept: PRIMARY CARE CLINIC | Facility: CLINIC | Age: 48
End: 2023-03-16
Payer: COMMERCIAL

## 2023-03-16 NOTE — TELEPHONE ENCOUNTER
PA sent in for Jerry Quiles (Otoole: [id number])  Rx #: [id number]  Mounjaro 12.5MG/0.5ML pen-injectors. Waiting on insurance to respond.

## 2023-03-20 ENCOUNTER — PATIENT MESSAGE (OUTPATIENT)
Dept: INTERNAL MEDICINE | Facility: CLINIC | Age: 48
End: 2023-03-20
Payer: COMMERCIAL

## 2023-03-20 DIAGNOSIS — E66.01 CLASS 2 SEVERE OBESITY DUE TO EXCESS CALORIES WITH SERIOUS COMORBIDITY AND BODY MASS INDEX (BMI) OF 35.0 TO 35.9 IN ADULT: ICD-10-CM

## 2023-03-20 RX ORDER — TIRZEPATIDE 12.5 MG/.5ML
12.5 INJECTION, SOLUTION SUBCUTANEOUS
Qty: 12 PEN | Refills: 3 | Status: CANCELLED | OUTPATIENT
Start: 2023-03-20

## 2023-03-28 ENCOUNTER — PATIENT MESSAGE (OUTPATIENT)
Dept: INTERNAL MEDICINE | Facility: CLINIC | Age: 48
End: 2023-03-28
Payer: COMMERCIAL

## 2023-03-28 DIAGNOSIS — E66.01 CLASS 2 SEVERE OBESITY DUE TO EXCESS CALORIES WITH SERIOUS COMORBIDITY AND BODY MASS INDEX (BMI) OF 35.0 TO 35.9 IN ADULT: Primary | ICD-10-CM

## 2023-03-28 RX ORDER — TIRZEPATIDE 5 MG/.5ML
5 INJECTION, SOLUTION SUBCUTANEOUS
Qty: 4 PEN | Refills: 3 | Status: SHIPPED | OUTPATIENT
Start: 2023-03-28 | End: 2023-04-25 | Stop reason: SDUPTHER

## 2023-04-12 ENCOUNTER — PATIENT MESSAGE (OUTPATIENT)
Dept: ADMINISTRATIVE | Facility: HOSPITAL | Age: 48
End: 2023-04-12
Payer: COMMERCIAL

## 2023-04-24 ENCOUNTER — PATIENT MESSAGE (OUTPATIENT)
Dept: INTERNAL MEDICINE | Facility: CLINIC | Age: 48
End: 2023-04-24
Payer: COMMERCIAL

## 2023-04-24 DIAGNOSIS — E66.01 CLASS 2 SEVERE OBESITY DUE TO EXCESS CALORIES WITH SERIOUS COMORBIDITY AND BODY MASS INDEX (BMI) OF 35.0 TO 35.9 IN ADULT: ICD-10-CM

## 2023-04-25 ENCOUNTER — PATIENT MESSAGE (OUTPATIENT)
Dept: INTERNAL MEDICINE | Facility: CLINIC | Age: 48
End: 2023-04-25
Payer: COMMERCIAL

## 2023-04-25 DIAGNOSIS — E66.01 CLASS 2 SEVERE OBESITY DUE TO EXCESS CALORIES WITH SERIOUS COMORBIDITY AND BODY MASS INDEX (BMI) OF 35.0 TO 35.9 IN ADULT: ICD-10-CM

## 2023-04-25 RX ORDER — TIRZEPATIDE 5 MG/.5ML
5 INJECTION, SOLUTION SUBCUTANEOUS
Qty: 12 PEN | Refills: 3 | Status: SHIPPED | OUTPATIENT
Start: 2023-04-25 | End: 2023-12-08 | Stop reason: SDUPTHER

## 2023-04-25 NOTE — TELEPHONE ENCOUNTER
No new care gaps identified.  [facility] Embedded Care Gaps. Reference number: [id number]. 4/25/2023   9:04:34 AM PRITESHT

## 2023-05-08 DIAGNOSIS — I10 PRIMARY HYPERTENSION: ICD-10-CM

## 2023-05-08 RX ORDER — VALSARTAN 320 MG/1
TABLET ORAL
Qty: 90 TABLET | Refills: 1 | Status: SHIPPED | OUTPATIENT
Start: 2023-05-08 | End: 2023-06-23

## 2023-05-08 NOTE — TELEPHONE ENCOUNTER
No care due was identified.  [facility] Embedded Care Due Messages. Reference number: [id number].   5/08/2023 9:18:19 AM CDT

## 2023-05-08 NOTE — TELEPHONE ENCOUNTER
Refill Decision Note   Jerry Quiles  is requesting a refill authorization.  Brief Assessment and Rationale for Refill:  Approve     Medication Therapy Plan:         Comments:     Note composed:6:26 PM 05/08/2023

## 2023-05-11 ENCOUNTER — PATIENT OUTREACH (OUTPATIENT)
Dept: ADMINISTRATIVE | Facility: HOSPITAL | Age: 48
End: 2023-05-11
Payer: COMMERCIAL

## 2023-05-21 DIAGNOSIS — M25.559 HIP PAIN, UNSPECIFIED LATERALITY: Primary | ICD-10-CM

## 2023-05-22 RX ORDER — FLUTICASONE PROPIONATE 0.5 MG/G
CREAM TOPICAL
Qty: 15 G | Refills: 11 | Status: SHIPPED | OUTPATIENT
Start: 2023-05-22 | End: 2024-01-23

## 2023-05-22 NOTE — TELEPHONE ENCOUNTER
Refill Encounter    PCP Visits: Recent Visits  Date Type Provider Dept   01/31/23 Office Visit Manav Molina MD [facility] Internal Medicine   10/31/22 Office Visit Manav Molina MD [facility] Medicine   07/12/22 Office Visit Manav Molina MD [facility]   Showing recent visits within past 360 days and meeting all other requirements  Future Appointments  No visits were found meeting these conditions.  Showing future appointments within next 720 days and meeting all other requirements     Last 3 Blood Pressure:   BP Readings from Last 3 Encounters:   02/13/23 108/81   01/31/23 (!) 135/90   12/06/22 110/78     Preferred Pharmacy:   [id number] [place], [place] - [address]  [address] [address]  Phone: [phone] Fax: [phone]    [facility]  [address] [place] [address]  Phone: [phone] Fax: [phone]    [address]  [address] [facility] [address] [facility] [address]  Phone: [phone] Fax: [phone]    [facility] [address] [place] [address] - [address]  [address] [facility] [address]  Phone: [phone] Fax: [phone]    Requested RX:  Requested Prescriptions     Pending Prescriptions Disp Refills    fluticasone propionate (CUTIVATE) 0.05 % cream [Pharmacy Med Name: Fluticasone Propionate 0.05 % Cre Pada] 15 g 0     Sig: Apply topically 2 times daily      RX Route: Normal

## 2023-06-02 ENCOUNTER — PATIENT MESSAGE (OUTPATIENT)
Dept: INTERNAL MEDICINE | Facility: CLINIC | Age: 48
End: 2023-06-02
Payer: COMMERCIAL

## 2023-06-02 DIAGNOSIS — L60.9 NAIL ABNORMALITIES: Primary | ICD-10-CM

## 2023-06-05 ENCOUNTER — PATIENT MESSAGE (OUTPATIENT)
Dept: PODIATRY | Facility: CLINIC | Age: 48
End: 2023-06-05
Payer: COMMERCIAL

## 2023-06-06 ENCOUNTER — OFFICE VISIT (OUTPATIENT)
Dept: PODIATRY | Facility: CLINIC | Age: 48
End: 2023-06-06
Payer: COMMERCIAL

## 2023-06-06 VITALS
WEIGHT: 189.13 LBS | SYSTOLIC BLOOD PRESSURE: 126 MMHG | BODY MASS INDEX: 33.51 KG/M2 | HEART RATE: 85 BPM | DIASTOLIC BLOOD PRESSURE: 84 MMHG | HEIGHT: 63 IN

## 2023-06-06 DIAGNOSIS — L60.9 NAIL ABNORMALITIES: ICD-10-CM

## 2023-06-06 DIAGNOSIS — B35.1 ONYCHOMYCOSIS DUE TO DERMATOPHYTE: Primary | ICD-10-CM

## 2023-06-06 DIAGNOSIS — L60.3 ONYCHODYSTROPHY: ICD-10-CM

## 2023-06-06 PROCEDURE — 4010F ACE/ARB THERAPY RXD/TAKEN: CPT | Mod: CPTII,S$GLB,, | Performed by: PODIATRIST

## 2023-06-06 PROCEDURE — 99203 PR OFFICE/OUTPT VISIT, NEW, LEVL III, 30-44 MIN: ICD-10-PCS | Mod: S$GLB,,, | Performed by: PODIATRIST

## 2023-06-06 PROCEDURE — 3008F PR BODY MASS INDEX (BMI) DOCUMENTED: ICD-10-PCS | Mod: CPTII,S$GLB,, | Performed by: PODIATRIST

## 2023-06-06 PROCEDURE — 99999 PR PBB SHADOW E&M-EST. PATIENT-LVL IV: CPT | Mod: PBBFAC,,, | Performed by: PODIATRIST

## 2023-06-06 PROCEDURE — 3074F SYST BP LT 130 MM HG: CPT | Mod: CPTII,S$GLB,, | Performed by: PODIATRIST

## 2023-06-06 PROCEDURE — 3008F BODY MASS INDEX DOCD: CPT | Mod: CPTII,S$GLB,, | Performed by: PODIATRIST

## 2023-06-06 PROCEDURE — 99203 OFFICE O/P NEW LOW 30 MIN: CPT | Mod: S$GLB,,, | Performed by: PODIATRIST

## 2023-06-06 PROCEDURE — 3079F DIAST BP 80-89 MM HG: CPT | Mod: CPTII,S$GLB,, | Performed by: PODIATRIST

## 2023-06-06 PROCEDURE — 99999 PR PBB SHADOW E&M-EST. PATIENT-LVL IV: ICD-10-PCS | Mod: PBBFAC,,, | Performed by: PODIATRIST

## 2023-06-06 PROCEDURE — 3074F PR MOST RECENT SYSTOLIC BLOOD PRESSURE < 130 MM HG: ICD-10-PCS | Mod: CPTII,S$GLB,, | Performed by: PODIATRIST

## 2023-06-06 PROCEDURE — 3079F PR MOST RECENT DIASTOLIC BLOOD PRESSURE 80-89 MM HG: ICD-10-PCS | Mod: CPTII,S$GLB,, | Performed by: PODIATRIST

## 2023-06-06 PROCEDURE — 1159F MED LIST DOCD IN RCRD: CPT | Mod: CPTII,S$GLB,, | Performed by: PODIATRIST

## 2023-06-06 PROCEDURE — 4010F PR ACE/ARB THEARPY RXD/TAKEN: ICD-10-PCS | Mod: CPTII,S$GLB,, | Performed by: PODIATRIST

## 2023-06-06 PROCEDURE — 1159F PR MEDICATION LIST DOCUMENTED IN MEDICAL RECORD: ICD-10-PCS | Mod: CPTII,S$GLB,, | Performed by: PODIATRIST

## 2023-06-06 RX ORDER — CICLOPIROX 80 MG/ML
SOLUTION TOPICAL NIGHTLY
Qty: 6.6 ML | Refills: 11 | Status: SHIPPED | OUTPATIENT
Start: 2023-06-06

## 2023-06-06 NOTE — PROGRESS NOTES
Subjective:      Patient ID: Jerry Quiles is a 47 y.o. female.    Chief Complaint: Nail Problem (Discoloration toenails/Toenail thickness)    Thick discolored misshapen toenails all 10 toes.  Gradual onset, worsening over the past many years.  Aggravated by socks shoes pressure ambulation.  No prior medical treatment.  No self-treatment.  Patient denies recent significant trauma and surgery both feet and nails.    Review of Systems   Constitutional: Negative for chills, diaphoresis, fever, malaise/fatigue and night sweats.   Cardiovascular:  Negative for claudication, cyanosis, leg swelling and syncope.   Skin:  Positive for nail changes. Negative for color change, dry skin, rash, suspicious lesions and unusual hair distribution.   Musculoskeletal:  Negative for falls, joint pain, joint swelling, muscle cramps, muscle weakness and stiffness.   Gastrointestinal:  Negative for constipation, diarrhea, nausea and vomiting.   Neurological:  Negative for brief paralysis, disturbances in coordination, focal weakness, numbness, paresthesias, sensory change and tremors.         Objective:      Physical Exam  Constitutional:       General: She is not in acute distress.     Appearance: She is well-developed. She is not diaphoretic.   Cardiovascular:      Pulses:           Popliteal pulses are 2+ on the right side and 2+ on the left side.        Dorsalis pedis pulses are 2+ on the right side and 2+ on the left side.        Posterior tibial pulses are 2+ on the right side and 2+ on the left side.      Comments: Capillary refill 3 seconds all toes/distal feet, all toes/both feet warm to touch.      Negative lymphadenopathy bilateral popliteal fossa and tarsal tunnel.      Negavie lower extremity edema bilateral.    Musculoskeletal:      Right ankle: No swelling, deformity, ecchymosis or lacerations. Normal range of motion. Normal pulse.      Right Achilles Tendon: Normal. No defects. Villasenor's test negative.   Lymphadenopathy:       Lower Body: No right inguinal adenopathy. No left inguinal adenopathy.      Comments: Negative lymphadenopathy bilateral popliteal fossa and tarsal tunnel.    Negative lymphangitic streaking bilateral feet/ankles/legs.   Skin:     General: Skin is warm and dry.      Capillary Refill: Capillary refill takes 2 to 3 seconds.      Coloration: Skin is not pale.      Findings: No abrasion, bruising, burn, ecchymosis, erythema, laceration, lesion or rash.      Nails: There is no clubbing.      Comments:   Toenails 1st, 2nd, 3rd, 4th, 5th  bilateral are hypertrophic thickened 2-3 mm, dystrophic, discolored tanish brown with tan, gray crumbly subungual debris.  Tender to distal nail plate pressure, without periungual skin abnormality of each.     Neurological:      Mental Status: She is alert and oriented to person, place, and time.      Sensory: No sensory deficit.      Motor: No tremor, atrophy or abnormal muscle tone.      Gait: Gait normal.      Deep Tendon Reflexes:      Reflex Scores:       Patellar reflexes are 2+ on the right side and 2+ on the left side.       Achilles reflexes are 2+ on the right side and 2+ on the left side.  Psychiatric:         Behavior: Behavior is cooperative.           Assessment:       Encounter Diagnoses   Name Primary?    Nail abnormalities     Onychomycosis due to dermatophyte Yes    Onychodystrophy          Plan:       Jerry was seen today for nail problem.    Diagnoses and all orders for this visit:    Onychomycosis due to dermatophyte    Nail abnormalities  -     Ambulatory referral/consult to Podiatry    Onychodystrophy    Other orders  -     ciclopirox (PENLAC) 8 % Soln; Apply topically nightly.      I counseled the patient on her conditions, their implications and medical management.        Penlac    Discussed conservative treatment with shoes of adequate dimensions, material, and style to alleviate symptoms and delay or prevent surgical intervention.    Dry well after hygiene.       Recommend natural fiber socks changed few times daily.      Declines biopsy of nail unit fungal fungal culture and KOH.          Follow up if symptoms worsen or fail to improve.

## 2023-06-07 ENCOUNTER — PATIENT MESSAGE (OUTPATIENT)
Dept: INTERNAL MEDICINE | Facility: CLINIC | Age: 48
End: 2023-06-07
Payer: COMMERCIAL

## 2023-06-09 ENCOUNTER — PATIENT MESSAGE (OUTPATIENT)
Dept: ADMINISTRATIVE | Facility: HOSPITAL | Age: 48
End: 2023-06-09
Payer: COMMERCIAL

## 2023-06-13 ENCOUNTER — PATIENT MESSAGE (OUTPATIENT)
Dept: INTERNAL MEDICINE | Facility: CLINIC | Age: 48
End: 2023-06-13
Payer: COMMERCIAL

## 2023-06-14 VITALS — SYSTOLIC BLOOD PRESSURE: 116 MMHG | DIASTOLIC BLOOD PRESSURE: 84 MMHG

## 2023-06-14 NOTE — TELEPHONE ENCOUNTER
Care Due:                  Date            Visit Type   Department     Provider  --------------------------------------------------------------------------------                                ESTABLISHED                              PATIENT -    [facility]  Last Visit: 01-      Rezdy      MEDICINE       Manav Molina  Next Visit: None Scheduled  None         None Found                                                            Last  Test          Frequency    Reason                     Performed    Due Date  --------------------------------------------------------------------------------    CBC.........  12 months..  valACYclovir.............  08- 08-    [facility] Embedded Care Due Messages. Reference number: [id number].   6/14/2023 9:21:45 AM CDT

## 2023-06-15 ENCOUNTER — TELEPHONE (OUTPATIENT)
Dept: INTERNAL MEDICINE | Facility: CLINIC | Age: 48
End: 2023-06-15
Payer: COMMERCIAL

## 2023-06-15 RX ORDER — AZELASTINE 1 MG/ML
1 SPRAY, METERED NASAL 2 TIMES DAILY
Qty: 30 ML | Refills: 2 | Status: SHIPPED | OUTPATIENT
Start: 2023-06-15 | End: 2023-06-19 | Stop reason: SDUPTHER

## 2023-06-15 NOTE — TELEPHONE ENCOUNTER
Pharmacy would like to know if the azelastine (ASTELIN) 137 mcg (0.1 %)  if for both nostrils or right or left nostril.

## 2023-06-15 NOTE — TELEPHONE ENCOUNTER
----- Message from Niraj Abebe sent at 6/15/2023  3:21 PM CDT -----  Name of Who is Calling: Meenu on behalf of the ARIANA FOWLER [[id number]]                What is the request in detail:  Meenu is calling because she need to know if pt using one spay a day, etc for azelastine (ASTELIN) 137 mcg (0.1 %) nasal spray.Please call back to further assist.                 Can the clinic reply by [facility]: No                What Number to Call Back if not in [facility]: [phone]

## 2023-06-16 ENCOUNTER — PATIENT MESSAGE (OUTPATIENT)
Dept: INTERNAL MEDICINE | Facility: CLINIC | Age: 48
End: 2023-06-16
Payer: COMMERCIAL

## 2023-06-19 RX ORDER — AZELASTINE 1 MG/ML
SPRAY, METERED NASAL
Qty: 30 ML | Refills: 2 | Status: SHIPPED | OUTPATIENT
Start: 2023-06-19 | End: 2024-03-15 | Stop reason: SDUPTHER

## 2023-06-19 NOTE — TELEPHONE ENCOUNTER
No care due was identified.  [facility] Embedded Care Due Messages. Reference number: [id number].   6/19/2023 9:17:53 AM CDT

## 2023-06-22 ENCOUNTER — PATIENT MESSAGE (OUTPATIENT)
Dept: INTERNAL MEDICINE | Facility: CLINIC | Age: 48
End: 2023-06-22
Payer: COMMERCIAL

## 2023-06-22 VITALS — SYSTOLIC BLOOD PRESSURE: 119 MMHG | DIASTOLIC BLOOD PRESSURE: 74 MMHG

## 2023-08-01 ENCOUNTER — PATIENT MESSAGE (OUTPATIENT)
Dept: INTERNAL MEDICINE | Facility: CLINIC | Age: 48
End: 2023-08-01
Payer: COMMERCIAL

## 2023-08-01 ENCOUNTER — TELEPHONE (OUTPATIENT)
Dept: INTERNAL MEDICINE | Facility: CLINIC | Age: 48
End: 2023-08-01
Payer: COMMERCIAL

## 2023-08-01 ENCOUNTER — NURSE TRIAGE (OUTPATIENT)
Dept: ADMINISTRATIVE | Facility: CLINIC | Age: 48
End: 2023-08-01
Payer: COMMERCIAL

## 2023-08-01 NOTE — TELEPHONE ENCOUNTER
Returned call to MsJorge Kb. Complains of having a left sided headache since Sunday. States discomfort is at a level 5 now.  States  her machine is possibly not correct but took BP as directed 160/118. Noted that BP was in 120's earlier . States had one Losartan in her office and took it today .[facility] was told to make an appointment to be seen today. Informed that note states see a provider today.  Found slot with Dr. Patterson tomorrow at 3 PM  at [facility].Advised to seek urgent care today in [place] as she lives in [place].

## 2023-08-01 NOTE — TELEPHONE ENCOUNTER
----- Message from Aleksey Suazo sent at 8/1/2023  3:37 PM CDT -----  Name of Who is Calling: ARIANA FOWLER [[id number]]            What is the request in detail: Patient is requesting call back to get advice asap              Can the clinic reply by [facility]: no              What Number [facility] Back [facility]: [phone]

## 2023-08-01 NOTE — TELEPHONE ENCOUNTER
----- Message from Vanda Payan sent at 8/1/2023  1:52 PM CDT -----  Regarding: self [phone]  Type:  Sooner Appointment Request    Patient is requesting a sooner appointment.  Patient declined first available appointment listed as well as another facility and provider .  Patient will not accept being placed on the waitlist and is requesting a message be sent to doctor.    Name of Caller: self     When is the first available appointment? 11/02    Symptoms: headaches since sunday    Would the patient rather a call back or a response via [facility]? Call back    Best Call Back Number: [phone]

## 2023-08-01 NOTE — TELEPHONE ENCOUNTER
Patient said she has an appointment on tomorrow at 3:00pm to see a provider. She said she just taken her bp and it's 160/104. She said other than the headache she feel fine. She wants to know if she should go home and take her bp medication tonight and in the am or should she just wait til she see the doctor on tomorrow evening. Please advise

## 2023-08-01 NOTE — TELEPHONE ENCOUNTER
Spoke with patient states her current B/P is reading 165/104.  Patient states she was taken off her B/P medication a month ago.  States she has a lingering headache.  Advised ED per protocol.  Patient verbalized understanding.   Reason for Disposition   Systolic BP >= 160 OR Diastolic >= 100, and any cardiac or neurologic symptoms (e.g., chest pain, difficulty breathing, unsteady gait, blurred vision)     Headache   Stiff neck (can't touch chin to chest)    Additional Information   Negative: Sounds like a life-threatening emergency to the triager   Negative: Pregnant > 20 weeks or postpartum (< 6 weeks after delivery) and new hand or face swelling   Negative: Pregnant > 20 weeks and BP > 140/90   Negative: Difficult to awaken or acting confused (e.g., disoriented, slurred speech)   Negative: Weakness of the face, arm or leg on one side of the body and new-onset   Negative: Numbness of the face, arm or leg on one side of the body and new-onset   Negative: Loss of speech or garbled speech and new-onset   Negative: Passed out (i.e., fainted, collapsed and was not responding)   Negative: Sounds like a life-threatening emergency to the triager   Negative: Unable to walk without falling    Protocols used: Headache-A-OH, High Blood Pressure-A-OH

## 2023-08-02 ENCOUNTER — OFFICE VISIT (OUTPATIENT)
Dept: INTERNAL MEDICINE | Facility: CLINIC | Age: 48
End: 2023-08-02
Payer: COMMERCIAL

## 2023-08-02 VITALS
WEIGHT: 189 LBS | OXYGEN SATURATION: 98 % | HEART RATE: 90 BPM | DIASTOLIC BLOOD PRESSURE: 80 MMHG | SYSTOLIC BLOOD PRESSURE: 132 MMHG | BODY MASS INDEX: 33.49 KG/M2 | HEIGHT: 63 IN

## 2023-08-02 DIAGNOSIS — R51.9 ACUTE NONINTRACTABLE HEADACHE, UNSPECIFIED HEADACHE TYPE: Primary | ICD-10-CM

## 2023-08-02 DIAGNOSIS — I10 HYPERTENSION, UNSPECIFIED TYPE: ICD-10-CM

## 2023-08-02 PROCEDURE — 3079F PR MOST RECENT DIASTOLIC BLOOD PRESSURE 80-89 MM HG: ICD-10-PCS | Mod: CPTII,S$GLB,, | Performed by: FAMILY MEDICINE

## 2023-08-02 PROCEDURE — 1159F MED LIST DOCD IN RCRD: CPT | Mod: CPTII,S$GLB,, | Performed by: FAMILY MEDICINE

## 2023-08-02 PROCEDURE — 3075F PR MOST RECENT SYSTOLIC BLOOD PRESS GE 130-139MM HG: ICD-10-PCS | Mod: CPTII,S$GLB,, | Performed by: FAMILY MEDICINE

## 2023-08-02 PROCEDURE — 3075F SYST BP GE 130 - 139MM HG: CPT | Mod: CPTII,S$GLB,, | Performed by: FAMILY MEDICINE

## 2023-08-02 PROCEDURE — 4010F PR ACE/ARB THEARPY RXD/TAKEN: ICD-10-PCS | Mod: CPTII,S$GLB,, | Performed by: FAMILY MEDICINE

## 2023-08-02 PROCEDURE — 99214 OFFICE O/P EST MOD 30 MIN: CPT | Mod: S$GLB,,, | Performed by: FAMILY MEDICINE

## 2023-08-02 PROCEDURE — 99999 PR PBB SHADOW E&M-EST. PATIENT-LVL IV: ICD-10-PCS | Mod: PBBFAC,,, | Performed by: FAMILY MEDICINE

## 2023-08-02 PROCEDURE — 3079F DIAST BP 80-89 MM HG: CPT | Mod: CPTII,S$GLB,, | Performed by: FAMILY MEDICINE

## 2023-08-02 PROCEDURE — 4010F ACE/ARB THERAPY RXD/TAKEN: CPT | Mod: CPTII,S$GLB,, | Performed by: FAMILY MEDICINE

## 2023-08-02 PROCEDURE — 99214 PR OFFICE/OUTPT VISIT, EST, LEVL IV, 30-39 MIN: ICD-10-PCS | Mod: S$GLB,,, | Performed by: FAMILY MEDICINE

## 2023-08-02 PROCEDURE — 99999 PR PBB SHADOW E&M-EST. PATIENT-LVL IV: CPT | Mod: PBBFAC,,, | Performed by: FAMILY MEDICINE

## 2023-08-02 PROCEDURE — 1159F PR MEDICATION LIST DOCUMENTED IN MEDICAL RECORD: ICD-10-PCS | Mod: CPTII,S$GLB,, | Performed by: FAMILY MEDICINE

## 2023-08-02 PROCEDURE — 3008F BODY MASS INDEX DOCD: CPT | Mod: CPTII,S$GLB,, | Performed by: FAMILY MEDICINE

## 2023-08-02 PROCEDURE — 3008F PR BODY MASS INDEX (BMI) DOCUMENTED: ICD-10-PCS | Mod: CPTII,S$GLB,, | Performed by: FAMILY MEDICINE

## 2023-08-02 RX ORDER — MELOXICAM 15 MG/1
15 TABLET ORAL DAILY
Qty: 30 TABLET | Refills: 0 | Status: SHIPPED | OUTPATIENT
Start: 2023-08-02 | End: 2023-08-02 | Stop reason: SDUPTHER

## 2023-08-02 RX ORDER — KETOROLAC TROMETHAMINE 30 MG/ML
15 INJECTION, SOLUTION INTRAMUSCULAR; INTRAVENOUS
Status: DISCONTINUED | OUTPATIENT
Start: 2023-08-02 | End: 2023-08-02

## 2023-08-02 RX ORDER — MELOXICAM 15 MG/1
15 TABLET ORAL DAILY
Qty: 30 TABLET | Refills: 0 | Status: SHIPPED | OUTPATIENT
Start: 2023-08-02 | End: 2023-09-01

## 2023-08-02 NOTE — PROGRESS NOTES
"Subjective:       Patient ID: Jerry Quiles is a 47 y.o. female. PCP Manav Molina MD     Chief Complaint: No chief complaint on file.    Patient is here for UC visit for headache  Epic message to her PCP:  "  Jerry BALDERAS Tracy Santos Staff (supporting Manav Molina MD) Yesterday (8:52 AM)     AR  I've had a horrible headache since Sunday evening.  Its on the left side of my head and mostly in the lower back close to the neck.  Its also making my neck sore at times.  Makes it difficult to even try to turn my head.  Its very sensitive if I push on the area.   I took my BP this morning bc I didnt even consider that.  My cheeks are normally beet red if my pressure is up and they havent been.  It was 130/90 and the second one was 127/87.  I had a Losartan here at work so I went ahead and took one.  Do you think its just a bad migraine?  And if so, can you call me something in or do I need to see you?  Please let me know.  Thanks. "    Headache was at left forehead and also the left side of her neck, with some improvement when she took BP meds and also when she took mobic   No neurologic symptoms    Pt recently got off all her BP meds (valsartan 320, amlodipine 5, carvedilol 6.25 bid). She has restarted them with the new headache and BP today on all 3 of the meds for the past 2 days is 132/80.  Recommended she continue taking all of these until she can see her PCP, and she says she has lots at home even though some have been taken off her med card  She will also keep a BP log morning, midday,a nd night to bring into her PCP at their next visit.      Review of patient's allergies indicates:   Allergen Reactions    Oxaprozin Hives and Swelling   The abov ereaciton as when she was 20 years olf, has not had a reaction to toher NSAIDs since including mobic and advil    Social History     Tobacco Use    Smoking status: Never    Smokeless tobacco: Never   Substance Use Topics    Alcohol use: Yes     Comment: " socially    Drug use: Never       Family History   Problem Relation Age of Onset    Hypertension Mother         meds    Diabetes type II Father     No Known Problems Sister     No Known Problems Brother     No Known Problems Maternal Grandmother     Cancer Maternal Grandfather         her mom was 15 yo    Heart failure Paternal Grandmother     Heart failure Paternal Grandfather     No Known Problems Brother     No Known Problems Brother        Past Surgical History:   Procedure Laterality Date    ABLATION  2006    for SVT    CHOLECYSTECTOMY  2001    No complications    EYE SURGERY  1998    RK, no complications    gastric sleeve  2012    REDUCTION OF BOTH BREASTS  1995    at age 20    TOTAL REDUCTION MAMMOPLASTY      WISDOM TOOTH EXTRACTION      20 yrs old       Patient Active Problem List   Diagnosis    Hypertension    Vertigo    BMI 33.0-33.9,adult    Acquired hypothyroidism    Class 2 severe obesity due to excess calories with serious comorbidity and body mass index (BMI) of 35.0 to 35.9 in adult       Current Outpatient Medications on File Prior to Visit   Medication Sig Dispense Refill    amLODIPine (NORVASC) 5 MG tablet Take 1 tablet (5 mg total) by mouth once daily. 90 tablet 1    azelastine (ASTELIN) 137 mcg (0.1 %) nasal spray 1 spray in both nostrils 2 times daily 30 mL 2    ciclopirox (PENLAC) 8 % Soln Apply topically nightly. 6.6 mL 11    famotidine (PEPCID) 20 MG tablet Take 1 tablet (20 mg total) by mouth nightly as needed (while taking meloxicam). 30 tablet 3    fluticasone propionate (CUTIVATE) 0.05 % cream Apply topically 2 times daily 15 g 11    meclizine (ANTIVERT) 12.5 mg tablet Take 1 tablet (12.5 mg total) by mouth 3 (three) times daily as needed. 60 tablet 0    tirzepatide (MOUNJARO) 5 mg/0.5 mL PnIj Inject 5 mg into the skin every 7 days. 12 pen 3    valACYclovir (VALTREX) 1000 MG tablet Take 2 tablets (2,000 mg total) by mouth 2 (two) times daily. For 1 day for cold sore outbreak.  Repeat as  "needed. 30 tablet 0     No current facility-administered medications on file prior to visit.           Review of Systems   Constitutional:  Negative for chills and fever.   HENT:  Negative for ear pain.    Eyes:  Negative for pain.   Respiratory:  Negative for chest tightness.    Cardiovascular:  Negative for chest pain.   Gastrointestinal:  Negative for abdominal pain.   Genitourinary:  Negative for flank pain.   Musculoskeletal:  Negative for gait problem.   Neurological:  Positive for headaches. Negative for syncope.   Psychiatric/Behavioral:  Negative for behavioral problems.        Objective:    /80 (BP Location: Left arm, Patient Position: Sitting, BP Method: Medium (Manual))   Pulse 90   Ht 5' 3" (1.6 m)   Wt 85.7 kg (189 lb)   SpO2 98%   BMI 33.48 kg/m²     Physical Exam  Vitals and nursing note reviewed.   Constitutional:       Appearance: She is well-developed.   HENT:      Head: Normocephalic and atraumatic.        Comments: Areas of pain. Of note: the left neck area muscles do not appear tightened compared to the right     Right Ear: Tympanic membrane, ear canal and external ear normal. There is no impacted cerumen.      Left Ear: Tympanic membrane, ear canal and external ear normal. There is no impacted cerumen.   Cardiovascular:      Rate and Rhythm: Normal rate.      Heart sounds: Normal heart sounds.   Pulmonary:      Effort: No respiratory distress.      Breath sounds: Normal breath sounds. No wheezing or rales.   Abdominal:      Palpations: Abdomen is soft.   Musculoskeletal:      Cervical back: Neck supple.   Skin:     General: Skin is dry.   Neurological:      General: No focal deficit present.      Mental Status: She is alert and oriented to person, place, and time. Mental status is at baseline.      Motor: No weakness.      Coordination: Coordination normal.      Gait: Gait normal.   Psychiatric:         Behavior: Behavior normal.         Assessment:       1. Acute nonintractable " headache, unspecified headache type    2. Hypertension, unspecified type        Plan:       Jerry was seen today for headache.    Diagnoses and all orders for this visit:    Acute nonintractable headache, unspecified headache type  Low suspicion for any intracranial cause given BP relationship and normal neuro exam     -     meloxicam (MOBIC) 15 MG tablet; Take 1 tablet (15 mg total) by mouth once daily. for 30 doses  -discussed pros/cons of toradol shot, she was interested for immediate relief and her daypro allergy reaction was not repeated with other nsaids. However nursing was very concerned about giving the IM inj given the past allergy reaction and decided to just use mobic that she had before    Hypertension, unspecified type  Take all her BP meds (valsartan 320, amlodipine 5, carvedilol 6.25 bid)  Keep BP log TID and bring to next PCP visit  If tapering, recommend slow taper when BP<110/65    Cc: PCP

## 2023-08-04 LAB — NONINV COLON CA DNA+OCC BLD SCRN STL QL: NEGATIVE

## 2023-11-02 ENCOUNTER — HOSPITAL ENCOUNTER (OUTPATIENT)
Dept: RADIOLOGY | Facility: HOSPITAL | Age: 48
Discharge: HOME | End: 2023-11-02
Attending: STUDENT IN AN ORGANIZED HEALTH CARE EDUCATION/TRAINING PROGRAM
Payer: COMMERCIAL

## 2023-11-02 DIAGNOSIS — Z12.31 ENCOUNTER FOR SCREENING MAMMOGRAM FOR BREAST CANCER: ICD-10-CM

## 2023-11-02 PROCEDURE — 77067 SCR MAMMO BI INCL CAD: CPT | Mod: 26,,, | Performed by: RADIOLOGY

## 2023-11-02 PROCEDURE — 77063 BREAST TOMOSYNTHESIS BI: CPT | Mod: 26,,, | Performed by: RADIOLOGY

## 2023-11-02 PROCEDURE — 77067 MAMMO DIGITAL SCREENING BILAT WITH TOMO: ICD-10-PCS | Mod: 26,,, | Performed by: RADIOLOGY

## 2023-11-02 PROCEDURE — 77067 SCR MAMMO BI INCL CAD: CPT | Mod: TC,PO

## 2023-11-02 PROCEDURE — 77063 MAMMO DIGITAL SCREENING BILAT WITH TOMO: ICD-10-PCS | Mod: 26,,, | Performed by: RADIOLOGY

## 2023-12-08 ENCOUNTER — PATIENT MESSAGE (OUTPATIENT)
Dept: INTERNAL MEDICINE | Facility: CLINIC | Age: 48
End: 2023-12-08
Payer: COMMERCIAL

## 2023-12-08 DIAGNOSIS — E66.01 CLASS 2 SEVERE OBESITY DUE TO EXCESS CALORIES WITH SERIOUS COMORBIDITY AND BODY MASS INDEX (BMI) OF 35.0 TO 35.9 IN ADULT: ICD-10-CM

## 2023-12-08 RX ORDER — TIRZEPATIDE 5 MG/.5ML
5 INJECTION, SOLUTION SUBCUTANEOUS
Qty: 12 PEN | Refills: 3 | Status: SHIPPED | OUTPATIENT
Start: 2023-12-08 | End: 2024-03-15

## 2023-12-08 NOTE — TELEPHONE ENCOUNTER
Care Due:                  Date            Visit Type   Department     Provider  --------------------------------------------------------------------------------                                ESTABLISHED                              PATIENT -    [facility] INTERNAL  Last Visit: 01-      [employer]      MEDICINE       Manav Molina  Next Visit: None Scheduled  None         None Found                                                            Last  Test          Frequency    Reason                     Performed    Due Date  --------------------------------------------------------------------------------    CBC.........  12 months..  valACYclovir.............  08- 08-    Cr..........  12 months..  famotidine, valACYclovir.  10-   10-    HBA1C.......  6 months...  tirzepatide..............  08- 02-    [facility] Embedded Care Due Messages. Reference number: [id number].   12/08/2023 11:37:22 AM CST

## 2024-01-23 ENCOUNTER — E-VISIT (OUTPATIENT)
Dept: FAMILY MEDICINE | Facility: CLINIC | Age: 49
End: 2024-01-23
Payer: COMMERCIAL

## 2024-01-23 ENCOUNTER — PATIENT MESSAGE (OUTPATIENT)
Dept: INTERNAL MEDICINE | Facility: CLINIC | Age: 49
End: 2024-01-23
Payer: COMMERCIAL

## 2024-01-23 DIAGNOSIS — N39.0 URINARY TRACT INFECTION WITHOUT HEMATURIA, SITE UNSPECIFIED: Primary | ICD-10-CM

## 2024-01-23 PROCEDURE — 99422 OL DIG E/M SVC 11-20 MIN: CPT | Mod: ,,, | Performed by: FAMILY MEDICINE

## 2024-01-23 RX ORDER — NITROFURANTOIN 25; 75 MG/1; MG/1
100 CAPSULE ORAL 2 TIMES DAILY
Qty: 14 CAPSULE | Refills: 0 | Status: SHIPPED | OUTPATIENT
Start: 2024-01-23 | End: 2024-01-30

## 2024-01-23 NOTE — PROGRESS NOTES
The patient location is: Home  The chief complaint leading to consultation is:Dysuria  Visit type: E visit  Total time spent with patient: 15 minutes  Each patient to whom he or she provides medical services by telemedicine is:  (1) informed of the relationship between the physician and patient and the respective role of any other health care provider with respect to management of the patient; and (2) notified that he or she may decline to receive medical services by telemedicine and may withdraw from such care at any time.    Notes:   Jerry was seen today for urinary tract infection.    Diagnoses and all orders for this visit:    Urinary tract infection without hematuria, site unspecified    Other orders  -     nitrofurantoin, macrocrystal-monohydrate, (MACROBID) 100 MG capsule; Take 1 capsule (100 mg total) by mouth 2 (two) times daily. for 7 days        Jerry Quiles presents with moderate dysuria and frequency, mild flank pain past 2-3 days. Denies nausea,vomiting,diarrhea or significant fever.    Past Medical History:   Diagnosis Date    HTN (hypertension) 2016    Was off of BP meds after sleeve. did not take meds when at home from Covid    Vertigo 06/2020    In ICU at [facility], checked for stroke     Past Surgical History:   Procedure Laterality Date    ABLATION  2006    for SVT    CHOLECYSTECTOMY  2001    No complications    EYE SURGERY  1998    RK, no complications    gastric sleeve  2012    REDUCTION OF BOTH BREASTS  1995    at age 20    TOTAL REDUCTION MAMMOPLASTY      WISDOM TOOTH EXTRACTION      20 yrs old     Review of patient's allergies indicates:   Allergen Reactions    Oxaprozin Hives and Swelling     Current Outpatient Medications on File Prior to Visit   Medication Sig Dispense Refill    amLODIPine (NORVASC) 5 MG tablet Take 1 tablet (5 mg total) by mouth once daily. 90 tablet 1    azelastine (ASTELIN) 137 mcg (0.1 %) nasal spray 1 spray in both nostrils 2 times daily 30 mL 2    ciclopirox (PENLAC) 8 %  Soln Apply topically nightly. 6.6 mL 11    famotidine (PEPCID) 20 MG tablet Take 1 tablet (20 mg total) by mouth nightly as needed (while taking meloxicam). 30 tablet 3    fluticasone propionate (CUTIVATE) 0.05 % cream Apply topically 2 times daily 15 g 11    meclizine (ANTIVERT) 12.5 mg tablet Take 1 tablet (12.5 mg total) by mouth 3 (three) times daily as needed. 60 tablet 0    tirzepatide (MOUNJARO) 5 mg/0.5 mL PnIj Inject 5 mg into the skin every 7 days. 12 pen 3    valACYclovir (VALTREX) 1000 MG tablet Take 2 tablets (2,000 mg total) by mouth 2 (two) times daily. For 1 day for cold sore outbreak.  Repeat as needed. 30 tablet 0     No current facility-administered medications on file prior to visit.     Social History     Socioeconomic History    Marital status:      Spouse name: Adrien    Number of children: 1    Years of education: 15    Highest education level: Associate degree: occupational, technical, or vocational program   Occupational History    Occupation:      Comment: law firm   Tobacco Use    Smoking status: Never    Smokeless tobacco: Never   Substance and Sexual Activity    Alcohol use: Yes     Comment: socially    Drug use: Never    Sexual activity: Yes     Partners: Male   Social History Narrative     1998- Robert. One child together and he has one.     Social Determinants of Health     Financial Resource Strain: Low Risk  (11/10/2020)    Overall Financial Resource Strain (CARDIA)     Difficulty of Paying Living Expenses: Not hard at all   Food Insecurity: No Food Insecurity (11/10/2020)    Hunger Vital Sign     Worried About Running Out of Food in the Last Year: Never true     Ran Out of Food in the Last Year: Never true   Transportation Needs: No Transportation Needs (11/10/2020)    PRAPARE - Transportation     Lack of Transportation (Medical): No     Lack of Transportation (Non-Medical): No   Physical Activity: Inactive (11/10/2020)    Exercise Vital Sign     Days  of Exercise per Week: 0 days     Minutes of Exercise per Session: 0 min   Stress: Stress Concern Present (11/10/2020)    [employer] [place] [employer] - Occupational Stress Questionnaire     Feeling of Stress : To some extent   Social Connections: Unknown (11/10/2020)    Social Connection and Isolation Panel [NHANES]     Frequency of Communication with Friends and Family: Patient declined     Frequency of Social Gatherings with Friends and Family: Patient declined     Attends Zoroastrianism Services: Patient declined     Active Member of Clubs or Organizations: Patient declined     Attends Club or Organization Meetings: Patient declined     Marital Status: Patient declined   Housing Stability: Unknown (11/10/2020)    Housing Stability Vital Sign     Unable to Pay for Housing in the Last Year: No     Unstable Housing in the Last Year: No     Family History   Problem Relation Age of Onset    Hypertension Mother         meds    Diabetes type II Father     No Known Problems Sister     No Known Problems Brother     No Known Problems Maternal Grandmother     Cancer Maternal Grandfather         her mom was 15 yo    Heart failure Paternal Grandmother     Heart failure Paternal Grandfather     No Known Problems Brother     No Known Problems Brother          ROS:  SKIN: No rashes, itching or changes in color or texture of skin.  EYES: Visual acuity fine. No photophobia, ocular pain or diplopia.EARS: Denies ear pain, discharge or vertigo.NOSE: No loss of smell, no epistaxis some postnasal drip.MOUTH & THROAT: No hoarseness or change in voice. No excessive gum bleeding.CHEST: Denies NAVARRO, cyanosis, wheezing  CARDIOVASCULAR: Denies chest pain, PND, orthopnea or reduced exercise tolerance.  ABDOMEN:  No weight loss.No abdominal pain, no hematemesis or blood in stool.  URINARY: Mild flank pain,no hematuria.

## 2024-02-16 ENCOUNTER — PATIENT MESSAGE (OUTPATIENT)
Dept: INTERNAL MEDICINE | Facility: CLINIC | Age: 49
End: 2024-02-16
Payer: COMMERCIAL

## 2024-02-16 NOTE — TELEPHONE ENCOUNTER
Spoke to patient to schedule an appointment and advised her to got to urgent care if her symptoms get severe. Patient verbalized understood.

## 2024-02-19 ENCOUNTER — HOSPITAL ENCOUNTER (OUTPATIENT)
Dept: RADIOLOGY | Facility: OTHER | Age: 49
Discharge: HOME | End: 2024-02-19
Payer: COMMERCIAL

## 2024-02-19 ENCOUNTER — PATIENT MESSAGE (OUTPATIENT)
Dept: INTERNAL MEDICINE | Facility: CLINIC | Age: 49
End: 2024-02-19

## 2024-02-19 ENCOUNTER — OFFICE VISIT (OUTPATIENT)
Dept: INTERNAL MEDICINE | Facility: CLINIC | Age: 49
End: 2024-02-19
Payer: COMMERCIAL

## 2024-02-19 VITALS
HEART RATE: 86 BPM | HEIGHT: 63 IN | DIASTOLIC BLOOD PRESSURE: 86 MMHG | OXYGEN SATURATION: 98 % | WEIGHT: 212.5 LBS | BODY MASS INDEX: 37.65 KG/M2 | SYSTOLIC BLOOD PRESSURE: 124 MMHG

## 2024-02-19 DIAGNOSIS — R10.9 ABDOMINAL PAIN, UNSPECIFIED ABDOMINAL LOCATION: Primary | ICD-10-CM

## 2024-02-19 DIAGNOSIS — R10.9 ABDOMINAL PAIN, UNSPECIFIED ABDOMINAL LOCATION: ICD-10-CM

## 2024-02-19 PROCEDURE — 99999 PR PBB SHADOW E&M-EST. PATIENT-LVL V: CPT | Mod: PBBFAC,,,

## 2024-02-19 PROCEDURE — 3008F BODY MASS INDEX DOCD: CPT | Mod: CPTII,S$GLB,,

## 2024-02-19 PROCEDURE — 74018 RADEX ABDOMEN 1 VIEW: CPT | Mod: TC,FY

## 2024-02-19 PROCEDURE — 3079F DIAST BP 80-89 MM HG: CPT | Mod: CPTII,S$GLB,,

## 2024-02-19 PROCEDURE — 99213 OFFICE O/P EST LOW 20 MIN: CPT | Mod: S$GLB,,,

## 2024-02-19 PROCEDURE — 3074F SYST BP LT 130 MM HG: CPT | Mod: CPTII,S$GLB,,

## 2024-02-19 PROCEDURE — 1159F MED LIST DOCD IN RCRD: CPT | Mod: CPTII,S$GLB,,

## 2024-02-19 PROCEDURE — 4010F ACE/ARB THERAPY RXD/TAKEN: CPT | Mod: CPTII,S$GLB,,

## 2024-02-19 PROCEDURE — 74018 RADEX ABDOMEN 1 VIEW: CPT | Mod: 26,,, | Performed by: RADIOLOGY

## 2024-02-19 RX ORDER — METOPROLOL TARTRATE 25 MG/1
TABLET, FILM COATED ORAL
COMMUNITY
End: 2024-03-15

## 2024-02-19 RX ORDER — VALSARTAN 320 MG/1
320 TABLET ORAL DAILY
COMMUNITY
End: 2024-03-15 | Stop reason: SDUPTHER

## 2024-02-19 NOTE — PROGRESS NOTES
CHIEF COMPLAINT     Chief Complaint   Patient presents with    Abdominal Pain       HPI     Jerry Quiles is a 48 y.o. female who presents for xxx today.    PCP is Manav Molina MD, patient is new to me. Pt reports RLQ pain x 1-2 weeks. Reports shooting pain in her rectum. She reports that she is now having the pain in the front around the bladder area. Straining when urinating is painful. Reports diarrhea. Pain occurs with defecation and urination. Denies blood in stool, fever. Has had some vomiting in relation to the stomach pain.    Past Medical History:  Past Medical History:   Diagnosis Date    HTN (hypertension) 2016    Was off of BP meds after sleeve. did not take meds when at home from Covid    Vertigo 06/2020    In ICU at [facility], checked for stroke       Home Medications:  Prior to Admission medications    Medication Sig Start Date End Date Taking? Authorizing Provider   amLODIPine (NORVASC) 5 MG tablet Take 1 tablet (5 mg total) by mouth once daily. 1/31/23  Yes Manav Molina MD   azelastine (ASTELIN) 137 mcg (0.1 %) nasal spray 1 spray in both nostrils 2 times daily 6/19/23  Yes Manav Molina MD   ciclopirox (PENLAC) 8 % Soln Apply topically nightly. 6/6/23  Yes Wicho Fitzgerald DPM   levonorgestreL (MIRENA) 21 mcg/24 hours (8 yrs) 52 mg IUD 1 each by Intrauterine route.   Yes Provider, Historical   metoprolol tartrate (LOPRESSOR) 25 MG tablet 1 tablet Orally qd for 30 day(s)   Yes Provider, Historical   tirzepatide (MOUNJARO) 5 mg/0.5 mL PnIj Inject 5 mg into the skin every 7 days. 12/8/23  Yes Manav Molina MD   valACYclovir (VALTREX) 1000 MG tablet Take 2 tablets (2,000 mg total) by mouth 2 (two) times daily. For 1 day for cold sore outbreak.  Repeat as needed. 1/2/24 1/1/25 Yes Brandin Williamson, NP   valsartan (DIOVAN) 320 MG tablet Take 320 mg by mouth once daily.   Yes Provider, Historical   famotidine (PEPCID) 20 MG tablet Take 1 tablet (20 mg total) by mouth  "nightly as needed (while taking meloxicam).  Patient not taking: Reported on 2/19/2024 7/14/22 7/14/23  Manav Molina MD   meclizine (ANTIVERT) 12.5 mg tablet Take 1 tablet (12.5 mg total) by mouth 3 (three) times daily as needed.  Patient not taking: Reported on 2/19/2024 11/11/22   Manav Molina MD       Review of Systems:  Review of Systems   Constitutional: Negative.    Gastrointestinal:  Positive for abdominal pain, diarrhea and vomiting.       Health Maintainence:   Immunizations:  Health Maintenance         Date Due Completion Date    COVID-19 Vaccine (1) Never done ---    TETANUS VACCINE Never done ---    Mammogram 11/02/2024 11/2/2023    Hemoglobin A1c (Diabetic Prevention Screening) 08/18/2025 8/18/2022    Cervical Cancer Screening 02/15/2026 2/15/2023    Colorectal Cancer Screening 07/27/2026 7/27/2023    Lipid Panel 08/18/2027 8/18/2022             PHYSICAL EXAM     /86   Pulse 86   Ht 5' 3" (1.6 m)   Wt 96.4 kg (212 lb 8.4 oz)   SpO2 98%   BMI 37.65 kg/m²     Physical Exam  Vitals reviewed.   Constitutional:       Appearance: She is well-developed.   HENT:      Head: Normocephalic and atraumatic.   Eyes:      Conjunctiva/sclera: Conjunctivae normal.   Cardiovascular:      Rate and Rhythm: Normal rate.   Pulmonary:      Effort: Pulmonary effort is normal. No respiratory distress.   Abdominal:      General: Abdomen is flat. Bowel sounds are normal.      Palpations: Abdomen is soft. There is no mass.      Tenderness: There is no abdominal tenderness. There is no guarding or rebound.   Skin:     General: Skin is warm and dry.      Findings: No rash.   Neurological:      Mental Status: She is alert and oriented to person, place, and time.      Coordination: Coordination normal.   Psychiatric:         Behavior: Behavior normal.         LABS     Lab Results   Component Value Date    HGBA1C 5.3 08/18/2022     CMP  Sodium   Date Value Ref Range Status   10/18/2022 136 136 - 145 mmol/L " Final     Potassium   Date Value Ref Range Status   10/31/2022 3.8 3.5 - 5.1 mmol/L Final     Chloride   Date Value Ref Range Status   10/18/2022 104 95 - 110 mmol/L Final     CO2   Date Value Ref Range Status   10/18/2022 27 23 - 29 mmol/L Final     Glucose   Date Value Ref Range Status   10/18/2022 97 70 - 110 mg/dL Final     BUN   Date Value Ref Range Status   10/18/2022 15 6 - 20 mg/dL Final     Creatinine   Date Value Ref Range Status   10/18/2022 0.8 0.5 - 1.4 mg/dL Final     Calcium   Date Value Ref Range Status   10/18/2022 9.3 8.7 - 10.5 mg/dL Final     Total Protein   Date Value Ref Range Status   08/18/2022 7.5 6.0 - 8.4 g/dL Final     Albumin   Date Value Ref Range Status   08/18/2022 3.9 3.5 - 5.2 g/dL Final     Total Bilirubin   Date Value Ref Range Status   08/18/2022 0.5 0.1 - 1.0 mg/dL Final     Comment:     For infants and newborns, interpretation of results should be based  on gestational age, weight and in agreement with clinical  observations.    Premature Infant recommended reference ranges:  Up to 24 hours.............<8.0 mg/dL  Up to 48 hours............<12.0 mg/dL  3-5 days..................<15.0 mg/dL  6-29 days.................<15.0 mg/dL       Alkaline Phosphatase   Date Value Ref Range Status   08/18/2022 52 (L) 55 - 135 U/L Final     AST   Date Value Ref Range Status   08/18/2022 17 10 - 40 U/L Final     ALT   Date Value Ref Range Status   08/18/2022 21 10 - 44 U/L Final     Anion Gap   Date Value Ref Range Status   10/18/2022 5 (L) 8 - 16 mmol/L Final     eGFR if    Date Value Ref Range Status   10/26/2020 >60 >60 mL/min/1.73 m^2 Final     eGFR if non    Date Value Ref Range Status   10/26/2020 >60 >60 mL/min/1.73 m^2 Final     Comment:     Calculation used to obtain the estimated glomerular filtration  rate (eGFR) is the CKD-EPI equation.        Lab Results   Component Value Date    WBC 8.03 08/18/2022    HGB 14.5 08/18/2022    HCT 43.2 08/18/2022     MCV 94 08/18/2022     08/18/2022     Lab Results   Component Value Date    CHOL 152 08/18/2022    CHOL 168 10/26/2020     Lab Results   Component Value Date    HDL 35 (L) 08/18/2022    HDL 40 10/26/2020     Lab Results   Component Value Date    LDLCALC 103.2 08/18/2022    LDLCALC 94.6 10/26/2020     Lab Results   Component Value Date    TRIG 69 08/18/2022    TRIG 167 (H) 10/26/2020     Lab Results   Component Value Date    CHOLHDL 23.0 08/18/2022    CHOLHDL 23.8 10/26/2020     Lab Results   Component Value Date    TSH 3.644 08/18/2022       ASSESSMENT/PLAN   1. Abdominal pain, unspecified abdominal location  -     X-Ray Abdomen AP 1 View; Future; Expected date: 02/19/2024             Brandin Williamson NP   Department of Internal Medicine - [facility]  11:53 AM

## 2024-03-04 ENCOUNTER — OFFICE VISIT (OUTPATIENT)
Dept: GASTROENTEROLOGY | Facility: CLINIC | Age: 49
End: 2024-03-04
Payer: COMMERCIAL

## 2024-03-04 VITALS — BODY MASS INDEX: 30.54 KG/M2 | WEIGHT: 172.38 LBS

## 2024-03-04 DIAGNOSIS — K59.04 CHRONIC IDIOPATHIC CONSTIPATION: ICD-10-CM

## 2024-03-04 DIAGNOSIS — R10.30 LOWER ABDOMINAL PAIN: ICD-10-CM

## 2024-03-04 DIAGNOSIS — R19.7 DIARRHEA, UNSPECIFIED TYPE: Primary | ICD-10-CM

## 2024-03-04 PROCEDURE — 3008F BODY MASS INDEX DOCD: CPT | Mod: CPTII,S$GLB,, | Performed by: NURSE PRACTITIONER

## 2024-03-04 PROCEDURE — 99999 PR PBB SHADOW E&M-EST. PATIENT-LVL IV: CPT | Mod: PBBFAC,,, | Performed by: NURSE PRACTITIONER

## 2024-03-04 PROCEDURE — 4010F ACE/ARB THERAPY RXD/TAKEN: CPT | Mod: CPTII,S$GLB,, | Performed by: NURSE PRACTITIONER

## 2024-03-04 PROCEDURE — 99204 OFFICE O/P NEW MOD 45 MIN: CPT | Mod: S$GLB,,, | Performed by: NURSE PRACTITIONER

## 2024-03-04 PROCEDURE — 1159F MED LIST DOCD IN RCRD: CPT | Mod: CPTII,S$GLB,, | Performed by: NURSE PRACTITIONER

## 2024-03-04 NOTE — PATIENT INSTRUCTIONS
Start daily fiber supplement such as Metamucil or psyllium husk. Mix 1 tablespoon in 7-8 ounces of liquid. Make sure to drink plenty of water.     Ibgard (green box) you can purchase over the counter. Take twice a day for a few days then as needed.     Stool studies    Stool Collection Kit Instructions    Place stool Collection Hat under your toilet seat   With and disposable spoon scoop and fill the provided cup with stool to the half way susan on the stool cup (if your stool is runny or liquid that is fine)  Place the top on the stool cup  Place the stool cup in the biohazard bag and zip the bag closed   Place the biohazard bag in the brown paper bag that is provided   Bring the stool sample to any [facility] lab (Any location where you can get blood work done)  Discard any extra equipment (throw away the stool hat, spoon WE DO NOT NEED THAT BACK)      If the sample is collected after clinic hours, please place in refrigerator or cooler until the next morning.  Please make sure that there is no Tissue, wipes, or other paper attached to the stool cup (the lab will have you repeat if something is attached to the cup).

## 2024-03-04 NOTE — PROGRESS NOTES
GASTROENTEROLOGY CLINIC NOTE    Chief Complaint: The primary encounter diagnosis was Diarrhea, unspecified type. Diagnoses of Lower abdominal pain and Chronic idiopathic constipation were also pertinent to this visit.  Referring provider/PCP: Manav Molina MD    Jerry Quiles is a 48 y.o. female who is a new patient to me who presents to clinic for abdominal pain, diarrhea, and constipation. Has h/o constipation but reports diarrhea began about two weeks ago.   Experienced episode of severe lower abdominal cramping accompanied by diarrhea and vomiting. Followed with PCP the next day who obtained abdominal xray. No significant stool burden noted on xray.   Patient with h/o constipation. Normal bowel pattern is one bowel movement every three days; stool hard and small in consistency.   Since this episode of cramping and diarrhea, continues to have one bowel movement every 2-3 days but consistency is mostly watery. Lower abdominal cramping occurs before bowel movements and is accompanied by shooting pain in her rectum.   Cramping and rectal pain improves after bowel movements. When bowel movements occur, they occur shortly after eating and are accompanied by urgency. Feels as if she is completely emptying with bowel movements.   Has had cholecystectomy and gastric sleeve. Is taking Mounjaro but takes it every three months. Recent episode of cramping and diarrhea did not occur after Mounjaro injection.   Denies nocturnal bowel movements, hematochezia, unexplained weight loss, decreased appetite.     Treatments: ox bile, milk of mg if no bm for several days  NSAIDs: ibuprofen few days a week  Caffeine: daily coffee  ETOH Use: occasional    GLP-1s: Mounjaro every three months  Anticoagulation or Antiplatelet: No      History of H.pylori:  H.pylori Treatment:  Prior Upper Endoscopy: no  Prior Colonoscopy: no  Cologuard Negative 2023  Family h/o Colon Cancer: No  Family h/o Crohn's Disease or Ulcerative Colitis:  No  Family h/o Celiac Sprue: No  Abdominal Surgeries: sleeve, erin      Review of Systems   Constitutional:  Negative for weight loss.   HENT:  Negative for sore throat.    Eyes:  Negative for blurred vision.   Respiratory:  Negative for cough.    Cardiovascular:  Negative for chest pain.   Gastrointestinal:  Positive for abdominal pain, constipation and diarrhea. Negative for blood in stool, heartburn, melena, nausea and vomiting.   Genitourinary:  Negative for dysuria.   Musculoskeletal:  Negative for myalgias.   Skin:  Negative for rash.   Neurological:  Negative for headaches.   Endo/Heme/Allergies:  Negative for environmental allergies.   Psychiatric/Behavioral:  Negative for suicidal ideas. The patient is not nervous/anxious.        Past Medical History: has a past medical history of HTN (hypertension) and Vertigo.    Past Surgical History: has a past surgical history that includes gastric sleeve (2012); Cholecystectomy (2001); Ablation (2006); Reduction of both breasts (1995); Eye surgery (1998); [place] tooth extraction; and Total Reduction Mammoplasty.    Family History:family history includes Cancer in her maternal grandfather; Diabetes type II in her father; Heart failure in her paternal grandfather and paternal grandmother; Hypertension in her mother; No Known Problems in her brother, brother, brother, maternal grandmother, and sister.    Allergies:   Review of patient's allergies indicates:   Allergen Reactions    Oxaprozin Hives and Swelling       Social History: reports that she has never smoked. She has never used smokeless tobacco. She reports current alcohol use. She reports that she does not use drugs.    Home medications:   Current Outpatient Medications on File Prior to Visit   Medication Sig Dispense Refill    amLODIPine (NORVASC) 5 MG tablet Take 1 tablet (5 mg total) by mouth once daily. 90 tablet 1    azelastine (ASTELIN) 137 mcg (0.1 %) nasal spray 1 spray in both nostrils 2 times daily 30  mL 2    ciclopirox (PENLAC) 8 % Soln Apply topically nightly. 6.6 mL 11    levonorgestreL (MIRENA) 21 mcg/24 hours (8 yrs) 52 mg IUD 1 each by Intrauterine route.      meclizine (ANTIVERT) 12.5 mg tablet Take 1 tablet (12.5 mg total) by mouth 3 (three) times daily as needed. 60 tablet 0    metoprolol tartrate (LOPRESSOR) 25 MG tablet 1 tablet Orally qd for 30 day(s)      tirzepatide (MOUNJARO) 5 mg/0.5 mL PnIj Inject 5 mg into the skin every 7 days. 12 pen 3    valACYclovir (VALTREX) 1000 MG tablet Take 2 tablets (2,000 mg total) by mouth 2 (two) times daily. For 1 day for cold sore outbreak.  Repeat as needed. 30 tablet 0    valsartan (DIOVAN) 320 MG tablet Take 320 mg by mouth once daily.      famotidine (PEPCID) 20 MG tablet Take 1 tablet (20 mg total) by mouth nightly as needed (while taking meloxicam). (Patient not taking: Reported on 2/19/2024) 30 tablet 3     No current facility-administered medications on file prior to visit.       Vital signs:  Wt 78.2 kg (172 lb 6.4 oz)   BMI 30.54 kg/m²     Physical Exam  Vitals reviewed.   Constitutional:       General: She is not in acute distress.     Appearance: Normal appearance. She is not ill-appearing.   HENT:      Head: Normocephalic.   Cardiovascular:      Rate and Rhythm: Normal rate and regular rhythm.      Heart sounds: Normal heart sounds. No murmur heard.  Pulmonary:      Effort: Pulmonary effort is normal. No respiratory distress.      Breath sounds: Normal breath sounds.   Chest:      Chest wall: No tenderness.   Abdominal:      General: Bowel sounds are normal. There is no distension.      Palpations: Abdomen is soft.      Tenderness: There is no abdominal tenderness. Negative signs include Molina's sign.      Hernia: No hernia is present.   Skin:     General: Skin is warm.   Neurological:      Mental Status: She is alert and oriented to person, place, and time.   Psychiatric:         Mood and Affect: Mood normal.         Behavior: Behavior normal.  "        Routine labs:  Lab Results   Component Value Date    WBC 8.03 08/18/2022    HGB 14.5 08/18/2022    HCT 43.2 08/18/2022    MCV 94 08/18/2022     08/18/2022     No results found for: "INR"  No results found for: "IRON", "FERRITIN", "TIBC", "FESATURATED"  Lab Results   Component Value Date     10/18/2022    K 3.8 10/31/2022     10/18/2022    CO2 27 10/18/2022    BUN 15 10/18/2022    CREATININE 0.8 10/18/2022     Lab Results   Component Value Date    ALBUMIN 3.9 08/18/2022    ALT 21 08/18/2022    AST 17 08/18/2022    ALKPHOS 52 (L) 08/18/2022    BILITOT 0.5 08/18/2022     No results found for: "GLUCOSE"  Lab Results   Component Value Date    TSH 3.644 08/18/2022     Lab Results   Component Value Date    CALCIUM 9.3 10/18/2022       Imaging:  X-Ray Abdomen AP 1 View  Narrative: EXAMINATION:  XR ABDOMEN AP 1 VIEW    CLINICAL HISTORY:  Unspecified abdominal pain    TECHNIQUE:  AP View(s) of the abdomen was performed.    COMPARISON:  None    FINDINGS:  Bowel loops are nondilated.  No significant stool burden.    Linear radiopaque density projects over the midline pelvis of uncertain etiology.  It is possible this resides outside the patient.  Findings can be correlated with CT, particularly if there is concern for radiopaque retained foreign body.    Surgical clips right upper and left lower quadrants.    Clear lung bases.  Impression: Please see above.    Electronically signed by: Tiffani Bruno  Date:    02/19/2024  Time:    13:00      I have reviewed prior labs, imaging, and notes.      Assessment:  1. Diarrhea, unspecified type    2. Lower abdominal pain    3. Chronic idiopathic constipation      Lower abdominal cramping accompanied by watery bowel movements every 2-3 days.   Suspect overflow diarrhea d/t h/o constipation.       Plan:  Orders Placed This Encounter    Stool culture    Clostridium difficile EIA    Fecal fat, qualitative    Giardia / Cryptosporidum, EIA    H. pylori antigen, " stool    Pancreatic elastase, fecal    Rotavirus antigen, stool    Stool Exam-Ova,Cysts,Parasites    WBC, Stool     Stool studies to r/o infectious or inflammatory etiology.   Start Metamucil daily.   IBgard       Plan of care discussed with patient who is in agreement and verbalized understanding.     I have explained the planned procedures to the patient.The risks, benefits and alternatives of the procedure were also explained in detail. Patient verbalized understanding, all questions were answered. The patient agrees to proceed as planned    Follow Up: KOLE Kraft, MACARIO,FNP-BC  [facility]    (Portions of this note were dictated using voice recognition software and may contain dictation related errors in spelling/grammar/syntax not found on text review)

## 2024-03-06 ENCOUNTER — PATIENT MESSAGE (OUTPATIENT)
Dept: INTERNAL MEDICINE | Facility: CLINIC | Age: 49
End: 2024-03-06
Payer: COMMERCIAL

## 2024-03-06 ENCOUNTER — PATIENT OUTREACH (OUTPATIENT)
Dept: ADMINISTRATIVE | Facility: HOSPITAL | Age: 49
End: 2024-03-06
Payer: COMMERCIAL

## 2024-03-06 DIAGNOSIS — Z00.00 ANNUAL PHYSICAL EXAM: Primary | ICD-10-CM

## 2024-03-07 NOTE — ADDENDUM NOTE
Addended by: DULCE MARIA BARNEY on: 3/7/2024 05:34 PM     Modules accepted: Orders     SUBJECTIVE:    Ms. Edith Hidalgo was seen at [facility] on March 4, 2019 for follow up on bilateral breast cancer diagnosed 2014 now with right axillary recurrence. She is currently doing fair. She denies any problems with fever, chills, nausea, vomiting, diarrhea, constipation, shortness of breath, or cough. Original Pathology/Treatment Summary  Diagnosed 2014   Left breast poorly differentiated ductal carcinoma, 6.3x2.6x5.1cm with cytology + LN prior to neoadjuvant chemo. Post chemo hyalinized scar in breast without invasive disease. 0/6 LN also with hyalinization present. ER-NH- Her2 +   Right breast moderately differentiated ductal carcinoma 3.7x2. 9x3.7 pre treatment. 1.5cm post neoadjuvant therapy 0/1 LN. ER+NH-Her2-    Received TCHP x 6 as neoadjuvant therapy. Then had Herceptin for 5 treatments. Last 6 treatments not given due to drop in LVEF. Patient seen at that time by Dr Mindy Mckeon. LVEF remained in the normal range but due to its decrease, patient elected not to proceed with treatment. Patient then started on anastrozole. Due to concerns over side effects she quickly stopped    She comes in today for neoadjuvant therapy in hopes of shrinking the axillary mass prior to surgery. She denies issues with fevers, chills, nausea, vomiting, diarrhea, shortness of breath, cough, headaches, or other complaints at this time    OBJECTIVE:    HEENT:The pupils are equal, round, reactive to light., The extraocular muscles are intact. , The sclerae are anicteric., The oral mucosa is pink and moist without lesions. NECK:The neck is supple. There is no jugular venous distention or thyromegaly. LUNG:Clear to auscultation and percussion. COR:Cardiac exam demonstrates a RRR, no S3, S4, or murmur. ABDOMEN:Soft, nontender, positive bowel sounds., No palpable hepatosplenomegaly. , No palpable masses. EXTREMITIES:There is no clubbing, cyanosis, or edema noted.   LYMPH NODES:No palpable lymph nodes within the neck, axilla, or groin. NEUROLOGIC:Neurologically the patient is grossly intact. DATA:  WBC (K/mcL)   Date Value   03/04/2019 5.0     RBC (mil/mcL)   Date Value   03/04/2019 3.93 (L)     HCT (%)   Date Value   03/04/2019 37.3     HGB (g/dL)   Date Value   03/04/2019 12.1     PLT (K/mcL)   Date Value   03/04/2019 235       LVEF 67%      ASSESSMENT:  Bilateral Breast Cancer diagnosed October 2014, s/p bilateral mastectomy, neoadjuvant chemotherapy [facility] with only partial completion of herceptin, radiation therapy, and 1 month anastrozole, now with new right axillary recurrence of the Her2- cancer. PLAN:  Ms. Deja Grullon will begin neoadjuvant therapy. Side effects were reviewed and all questions answered. Please see orders for details. Doses will be adjusted based on height, weight, marrow function, renal and hepatic function along with any other health issue. She will have labs checked in 1 and 2 weeks for toxicities. She will return to see me in 3 weeks for her next cycle of chemotherapy if appropriate.

## 2024-03-08 ENCOUNTER — PATIENT MESSAGE (OUTPATIENT)
Dept: INTERNAL MEDICINE | Facility: CLINIC | Age: 49
End: 2024-03-08
Payer: COMMERCIAL

## 2024-03-08 ENCOUNTER — PATIENT MESSAGE (OUTPATIENT)
Dept: GASTROENTEROLOGY | Facility: CLINIC | Age: 49
End: 2024-03-08
Payer: COMMERCIAL

## 2024-03-08 NOTE — TELEPHONE ENCOUNTER
She changed her annual appt to 4/12/2024 and canceleld previously scheduled appt in June with you.    LOV 1/31/2023

## 2024-03-08 NOTE — TELEPHONE ENCOUNTER
Procedure  Comprehensive Metabolic Panel  Lipid Panel  TSH  CBC Auto Differential  Hemoglobin A1C  Process Instructions  Please collect Green, Lithium Heparin with Inert Gel tube.  Physician may require patient to be fasting prior to collection.Please collect a Lavender, EDTA tube or EDTA Microtainer.  If the patient is a known platelet clumper, please collect an additional citrate (blue top) tube.  Location Instructions  Please park in surface lot and check in at main registration.  Department Address  [address] [facility] [place] [address]  Notes  Annual physical exam [Z00.00]  Patient Instructions  1. Do not eat or drink anything for TEN HOURS (10) PRIOR TO TEST. Do not chew gum or eat candy mints, even those claiming to be sugar free. Water is allowed but do not drink any other fluids   2. Take your regular daily medicines as your doctor has ordered. If you are diabetic, do not take your insulin or other diabetic medication until your blood is drawn and you are ready to eat. Your physician may have special instructions for diabetics. Check with your doctor if you have any questions.  3. Alcoholic beverages are not allowed starting at 6:00pm the evening before your appointment.    Schedule

## 2024-03-11 ENCOUNTER — LAB VISIT (OUTPATIENT)
Dept: LAB | Facility: HOSPITAL | Age: 49
End: 2024-03-11
Attending: NURSE PRACTITIONER
Payer: COMMERCIAL

## 2024-03-11 DIAGNOSIS — R19.7 DIARRHEA, UNSPECIFIED TYPE: ICD-10-CM

## 2024-03-11 LAB
C DIFF GDH STL QL: NEGATIVE
C DIFF TOX A+B STL QL IA: NEGATIVE
WBC #/AREA STL HPF: NORMAL /[HPF]

## 2024-03-11 PROCEDURE — 87425 ROTAVIRUS AG IA: CPT | Performed by: NURSE PRACTITIONER

## 2024-03-11 PROCEDURE — 89055 LEUKOCYTE ASSESSMENT FECAL: CPT | Performed by: NURSE PRACTITIONER

## 2024-03-11 PROCEDURE — 82653 EL-1 FECAL QUANTITATIVE: CPT | Performed by: NURSE PRACTITIONER

## 2024-03-11 PROCEDURE — 87329 GIARDIA AG IA: CPT | Performed by: NURSE PRACTITIONER

## 2024-03-11 PROCEDURE — 87449 NOS EACH ORGANISM AG IA: CPT | Performed by: NURSE PRACTITIONER

## 2024-03-11 PROCEDURE — 87046 STOOL CULTR AEROBIC BACT EA: CPT | Performed by: NURSE PRACTITIONER

## 2024-03-11 PROCEDURE — 87338 HPYLORI STOOL AG IA: CPT | Performed by: NURSE PRACTITIONER

## 2024-03-11 PROCEDURE — 87449 NOS EACH ORGANISM AG IA: CPT | Mod: 91 | Performed by: NURSE PRACTITIONER

## 2024-03-11 PROCEDURE — 87045 FECES CULTURE AEROBIC BACT: CPT | Performed by: NURSE PRACTITIONER

## 2024-03-11 PROCEDURE — 82705 FATS/LIPIDS FECES QUAL: CPT | Performed by: NURSE PRACTITIONER

## 2024-03-11 PROCEDURE — 87427 SHIGA-LIKE TOXIN AG IA: CPT | Mod: 59 | Performed by: NURSE PRACTITIONER

## 2024-03-12 LAB
CRYPTOSP AG STL QL IA: NEGATIVE
E COLI SXT1 STL QL IA: NEGATIVE
E COLI SXT2 STL QL IA: NEGATIVE
G LAMBLIA AG STL QL IA: NEGATIVE
RV AG STL QL IA.RAPID: NEGATIVE

## 2024-03-13 ENCOUNTER — LAB VISIT (OUTPATIENT)
Dept: LAB | Facility: HOSPITAL | Age: 49
End: 2024-03-13
Attending: STUDENT IN AN ORGANIZED HEALTH CARE EDUCATION/TRAINING PROGRAM
Payer: COMMERCIAL

## 2024-03-13 DIAGNOSIS — Z00.00 ANNUAL PHYSICAL EXAM: ICD-10-CM

## 2024-03-13 LAB
ALBUMIN SERPL BCP-MCNC: 3.6 G/DL (ref 3.5–5.2)
ALP SERPL-CCNC: 49 U/L (ref 55–135)
ALT SERPL W/O P-5'-P-CCNC: 16 U/L (ref 10–44)
ANION GAP SERPL CALC-SCNC: 7 MMOL/L (ref 8–16)
AST SERPL-CCNC: 15 U/L (ref 10–40)
BACTERIA STL CULT: NORMAL
BASOPHILS # BLD AUTO: 0.06 K/UL (ref 0–0.2)
BASOPHILS NFR BLD: 0.8 % (ref 0–1.9)
BILIRUB SERPL-MCNC: 0.6 MG/DL (ref 0.1–1)
BUN SERPL-MCNC: 11 MG/DL (ref 6–20)
CALCIUM SERPL-MCNC: 9.5 MG/DL (ref 8.7–10.5)
CHLORIDE SERPL-SCNC: 109 MMOL/L (ref 95–110)
CHOLEST SERPL-MCNC: 192 MG/DL (ref 120–199)
CHOLEST/HDLC SERPL: 4.5 {RATIO} (ref 2–5)
CO2 SERPL-SCNC: 22 MMOL/L (ref 23–29)
CREAT SERPL-MCNC: 0.8 MG/DL (ref 0.5–1.4)
DIFFERENTIAL METHOD BLD: ABNORMAL
ELASTASE 1, FECAL: 324 MCG/G
EOSINOPHIL # BLD AUTO: 0.1 K/UL (ref 0–0.5)
EOSINOPHIL NFR BLD: 1.1 % (ref 0–8)
ERYTHROCYTE [DISTWIDTH] IN BLOOD BY AUTOMATED COUNT: 13.1 % (ref 11.5–14.5)
EST. GFR  (NO RACE VARIABLE): >60 ML/MIN/1.73 M^2
ESTIMATED AVG GLUCOSE: 103 MG/DL (ref 68–131)
FAT STL QL: NORMAL
GLUCOSE SERPL-MCNC: 101 MG/DL (ref 70–110)
HBA1C MFR BLD: 5.2 % (ref 4–5.6)
HCT VFR BLD AUTO: 41.8 % (ref 37–48.5)
HDLC SERPL-MCNC: 43 MG/DL (ref 40–75)
HDLC SERPL: 22.4 % (ref 20–50)
HGB BLD-MCNC: 13.5 G/DL (ref 12–16)
IMM GRANULOCYTES # BLD AUTO: 0.02 K/UL (ref 0–0.04)
IMM GRANULOCYTES NFR BLD AUTO: 0.3 % (ref 0–0.5)
LDLC SERPL CALC-MCNC: 130.8 MG/DL (ref 63–159)
LYMPHOCYTES # BLD AUTO: 2.1 K/UL (ref 1–4.8)
LYMPHOCYTES NFR BLD: 27.5 % (ref 18–48)
MCH RBC QN AUTO: 31.1 PG (ref 27–31)
MCHC RBC AUTO-ENTMCNC: 32.3 G/DL (ref 32–36)
MCV RBC AUTO: 96 FL (ref 82–98)
MONOCYTES # BLD AUTO: 0.7 K/UL (ref 0.3–1)
MONOCYTES NFR BLD: 9.7 % (ref 4–15)
NEUTRAL FAT STL QL: NORMAL
NEUTROPHILS # BLD AUTO: 4.6 K/UL (ref 1.8–7.7)
NEUTROPHILS NFR BLD: 60.6 % (ref 38–73)
NONHDLC SERPL-MCNC: 149 MG/DL
NRBC BLD-RTO: 0 /100 WBC
PLATELET # BLD AUTO: 316 K/UL (ref 150–450)
PMV BLD AUTO: 11 FL (ref 9.2–12.9)
POTASSIUM SERPL-SCNC: 4.3 MMOL/L (ref 3.5–5.1)
PROT SERPL-MCNC: 6.5 G/DL (ref 6–8.4)
RBC # BLD AUTO: 4.34 M/UL (ref 4–5.4)
SODIUM SERPL-SCNC: 138 MMOL/L (ref 136–145)
TRIGL SERPL-MCNC: 91 MG/DL (ref 30–150)
TSH SERPL DL<=0.005 MIU/L-ACNC: 3.34 UIU/ML (ref 0.4–4)
WBC # BLD AUTO: 7.6 K/UL (ref 3.9–12.7)

## 2024-03-13 PROCEDURE — 83036 HEMOGLOBIN GLYCOSYLATED A1C: CPT | Performed by: STUDENT IN AN ORGANIZED HEALTH CARE EDUCATION/TRAINING PROGRAM

## 2024-03-13 PROCEDURE — 84443 ASSAY THYROID STIM HORMONE: CPT | Performed by: STUDENT IN AN ORGANIZED HEALTH CARE EDUCATION/TRAINING PROGRAM

## 2024-03-13 PROCEDURE — 80053 COMPREHEN METABOLIC PANEL: CPT | Performed by: STUDENT IN AN ORGANIZED HEALTH CARE EDUCATION/TRAINING PROGRAM

## 2024-03-13 PROCEDURE — 36415 COLL VENOUS BLD VENIPUNCTURE: CPT | Mod: PO | Performed by: STUDENT IN AN ORGANIZED HEALTH CARE EDUCATION/TRAINING PROGRAM

## 2024-03-13 PROCEDURE — 80061 LIPID PANEL: CPT | Performed by: STUDENT IN AN ORGANIZED HEALTH CARE EDUCATION/TRAINING PROGRAM

## 2024-03-13 PROCEDURE — 85025 COMPLETE CBC W/AUTO DIFF WBC: CPT | Performed by: STUDENT IN AN ORGANIZED HEALTH CARE EDUCATION/TRAINING PROGRAM

## 2024-03-15 ENCOUNTER — OFFICE VISIT (OUTPATIENT)
Dept: INTERNAL MEDICINE | Facility: CLINIC | Age: 49
End: 2024-03-15
Payer: COMMERCIAL

## 2024-03-15 VITALS — HEIGHT: 63 IN | HEART RATE: 75 BPM | OXYGEN SATURATION: 99 % | BODY MASS INDEX: 30.66 KG/M2 | WEIGHT: 173.06 LBS

## 2024-03-15 DIAGNOSIS — B00.9 HSV INFECTION: ICD-10-CM

## 2024-03-15 DIAGNOSIS — Z00.00 ANNUAL PHYSICAL EXAM: Primary | ICD-10-CM

## 2024-03-15 DIAGNOSIS — E66.01 CLASS 2 SEVERE OBESITY DUE TO EXCESS CALORIES WITH SERIOUS COMORBIDITY AND BODY MASS INDEX (BMI) OF 35.0 TO 35.9 IN ADULT: ICD-10-CM

## 2024-03-15 DIAGNOSIS — I10 HYPERTENSION, UNSPECIFIED TYPE: ICD-10-CM

## 2024-03-15 DIAGNOSIS — I10 BENIGN ESSENTIAL HTN: Chronic | ICD-10-CM

## 2024-03-15 DIAGNOSIS — Z91.89 AT RISK FOR SIDE EFFECT OF MEDICATION: ICD-10-CM

## 2024-03-15 PROCEDURE — 3008F BODY MASS INDEX DOCD: CPT | Mod: CPTII,S$GLB,, | Performed by: STUDENT IN AN ORGANIZED HEALTH CARE EDUCATION/TRAINING PROGRAM

## 2024-03-15 PROCEDURE — 99999 PR PBB SHADOW E&M-EST. PATIENT-LVL III: CPT | Mod: PBBFAC,,, | Performed by: STUDENT IN AN ORGANIZED HEALTH CARE EDUCATION/TRAINING PROGRAM

## 2024-03-15 PROCEDURE — 1159F MED LIST DOCD IN RCRD: CPT | Mod: CPTII,S$GLB,, | Performed by: STUDENT IN AN ORGANIZED HEALTH CARE EDUCATION/TRAINING PROGRAM

## 2024-03-15 PROCEDURE — 3044F HG A1C LEVEL LT 7.0%: CPT | Mod: CPTII,S$GLB,, | Performed by: STUDENT IN AN ORGANIZED HEALTH CARE EDUCATION/TRAINING PROGRAM

## 2024-03-15 PROCEDURE — 4010F ACE/ARB THERAPY RXD/TAKEN: CPT | Mod: CPTII,S$GLB,, | Performed by: STUDENT IN AN ORGANIZED HEALTH CARE EDUCATION/TRAINING PROGRAM

## 2024-03-15 PROCEDURE — 99214 OFFICE O/P EST MOD 30 MIN: CPT | Mod: 25,S$GLB,, | Performed by: STUDENT IN AN ORGANIZED HEALTH CARE EDUCATION/TRAINING PROGRAM

## 2024-03-15 PROCEDURE — 99396 PREV VISIT EST AGE 40-64: CPT | Mod: S$GLB,,, | Performed by: STUDENT IN AN ORGANIZED HEALTH CARE EDUCATION/TRAINING PROGRAM

## 2024-03-15 RX ORDER — CARVEDILOL 6.25 MG/1
6.25 TABLET ORAL 2 TIMES DAILY WITH MEALS
Qty: 180 TABLET | Refills: 3 | Status: SHIPPED | OUTPATIENT
Start: 2024-03-15 | End: 2025-03-15

## 2024-03-15 RX ORDER — AMLODIPINE BESYLATE 5 MG/1
5 TABLET ORAL DAILY
Qty: 90 TABLET | Refills: 1 | Status: SHIPPED | OUTPATIENT
Start: 2024-03-15

## 2024-03-15 RX ORDER — VALACYCLOVIR HYDROCHLORIDE 1 G/1
2000 TABLET, FILM COATED ORAL 2 TIMES DAILY
Qty: 30 TABLET | Refills: 0 | Status: SHIPPED | OUTPATIENT
Start: 2024-03-15 | End: 2025-03-15

## 2024-03-15 RX ORDER — MECLIZINE HCL 12.5 MG 12.5 MG/1
12.5 TABLET ORAL 3 TIMES DAILY PRN
Qty: 60 TABLET | Refills: 0 | Status: CANCELLED | OUTPATIENT
Start: 2024-03-15

## 2024-03-15 RX ORDER — AZELASTINE 1 MG/ML
SPRAY, METERED NASAL
Qty: 30 ML | Refills: 2 | Status: SHIPPED | OUTPATIENT
Start: 2024-03-15

## 2024-03-15 RX ORDER — FAMOTIDINE 20 MG/1
20 TABLET, FILM COATED ORAL NIGHTLY PRN
Qty: 30 TABLET | Refills: 3 | Status: CANCELLED | OUTPATIENT
Start: 2024-03-15 | End: 2025-03-15

## 2024-03-15 RX ORDER — VALSARTAN 320 MG/1
320 TABLET ORAL DAILY
Qty: 90 TABLET | Refills: 3 | Status: SHIPPED | OUTPATIENT
Start: 2024-03-15

## 2024-03-15 NOTE — PROGRESS NOTES
[facility]    Subjective:       Patient ID: Jerry Quiles is a 48 y.o. female.    Chief Complaint: f/u Annual Exam    History was obtained from the patient and supplemented through chart review.  This pt is known to me.    HPI:    Patient is a 48 y.o. female with chronic medical problems including vertigo, HTN, acquired hypothyroidism, obesity. Presents for annual.    Last seen in person Oct 2022    Wishes for weight loss appt in addition to annual    HTN  Uncontrolled  Not taking coreg, it seems  Not taking valsartan, confused with valcyclovir  Emphasized the importance of compliance with BP regim with weight loss meds and in general. Pt sttes understanding    Taking amlodipine 10, losartan 100 -> valsartan 320, coreg 6.25 BID, hydrochlorothiazide 50 -> 25 mg.  Following up next week with portal messages    Obesity  Insurance stopped covering mounjaro, now wishes to try contrave  R/B discussed in depth  No refills until BP controlled pt aware    CBD at law firm.  Prefers to have care in [facility] due to decrease time away from work     Spin bike, has been exercising most nights    Wt Readings from Last 15 Encounters:   03/15/24 78.5 kg (173 lb 1 oz)   03/04/24 78.2 kg (172 lb 6.4 oz)   02/19/24 96.4 kg (212 lb 8.4 oz)   08/02/23 85.7 kg (189 lb)   06/06/23 85.8 kg (189 lb 2.5 oz)   10/31/22 85.8 kg (189 lb 2.5 oz)   03/31/21 86.8 kg (191 lb 5.8 oz)   11/17/20 87.3 kg (192 lb 7.4 oz)   11/10/20 86.4 kg (190 lb 7.6 oz)   10/26/20 85.9 kg (189 lb 6 oz)          Medical History  Past Medical History:   Diagnosis Date    HTN (hypertension) 2016    Was off of BP meds after sleeve. did not take meds when at home from Covid    Vertigo 06/2020    In ICU at [facility], checked for stroke       Review of Systems   Constitutional:  Negative for fever.   HENT:  Negative for trouble swallowing.    Respiratory:  Negative for shortness of breath.    Cardiovascular:  Negative for chest pain.   Gastrointestinal:  Negative for  "constipation, diarrhea, nausea and vomiting.   Musculoskeletal:  Negative for gait problem.   Neurological:  Negative for dizziness and seizures.   Psychiatric/Behavioral:  Negative for hallucinations.          Surgical hx, family hx, social hx   No colon cancer, no breast cancer  Have been reviewed      Current Outpatient Medications:     famotidine (PEPCID) 20 MG tablet, Take 1 tablet (20 mg total) by mouth nightly as needed (while taking meloxicam)., Disp: 30 tablet, Rfl: 3    levonorgestreL (MIRENA) 21 mcg/24 hours (8 yrs) 52 mg IUD, 1 each by Intrauterine route., Disp: , Rfl:     meclizine (ANTIVERT) 12.5 mg tablet, Take 1 tablet (12.5 mg total) by mouth 3 (three) times daily as needed., Disp: 60 tablet, Rfl: 0    amLODIPine (NORVASC) 5 MG tablet, Take 1 tablet (5 mg total) by mouth once daily., Disp: 90 tablet, Rfl: 1    azelastine (ASTELIN) 137 mcg (0.1 %) nasal spray, 1 spray in both nostrils 2 times daily, Disp: 30 mL, Rfl: 2    carvediloL (COREG) 6.25 MG tablet, Take 1 tablet (6.25 mg total) by mouth 2 (two) times daily with meals., Disp: 180 tablet, Rfl: 3    ciclopirox (PENLAC) 8 % Soln, Apply topically nightly. (Patient not taking: Reported on 3/15/2024), Disp: 6.6 mL, Rfl: 11    naltrexone-bupropion (CONTRAVE) 8-90 mg TbSR, Take 1 tab in AM  for 7 days, then 2 tabs in AM for 7 days, then 2 tabs in AM and 1 tab in PM for 7 days, then 2 tabs twice a day. Continue at that dose, Disp: 70 tablet, Rfl: 0    valACYclovir (VALTREX) 1000 MG tablet, Take 2 tablets (2,000 mg total) by mouth 2 (two) times daily. For 1 day for cold sore outbreak.  Repeat as needed., Disp: 30 tablet, Rfl: 0    valsartan (DIOVAN) 320 MG tablet, Take 1 tablet (320 mg total) by mouth once daily., Disp: 90 tablet, Rfl: 3    Objective:        Body mass index is 30.66 kg/m².  Vitals:    03/15/24 0904   BP: (!) (P) 148/98   Pulse: 75   SpO2: 99%   Weight: 78.5 kg (173 lb 1 oz)   Height: 5' 3" (1.6 m)   PainSc: 0-No pain       Physical " Exam  Vitals and nursing note reviewed.   Constitutional:       General: She is not in acute distress.     Appearance: Normal appearance. She is not ill-appearing.   HENT:      Head: Normocephalic and atraumatic.   Eyes:      General: No scleral icterus.  Cardiovascular:      Rate and Rhythm: Normal rate and regular rhythm.      Heart sounds: Normal heart sounds.   Pulmonary:      Effort: Pulmonary effort is normal.   Musculoskeletal:         General: No deformity.      Cervical back: Normal range of motion.   Neurological:      Mental Status: She is alert and oriented to person, place, and time.   Psychiatric:         Behavior: Behavior normal.           Lab Results   Component Value Date    WBC 7.60 03/13/2024    HGB 13.5 03/13/2024    HCT 41.8 03/13/2024     03/13/2024    CHOL 192 03/13/2024    TRIG 91 03/13/2024    HDL 43 03/13/2024    ALT 16 03/13/2024    AST 15 03/13/2024     03/13/2024    K 4.3 03/13/2024     03/13/2024    CREATININE 0.8 03/13/2024    BUN 11 03/13/2024    CO2 22 (L) 03/13/2024    TSH 3.338 03/13/2024    HGBA1C 5.2 03/13/2024       The 10-year ASCVD risk score (Renee NOEL, et al., 2019) is: 2.5%    Values used to calculate the score:      Age: 48 years      Sex: Female      Is Non- : No      Diabetic: No      Tobacco smoker: No      Systolic Blood Pressure: 148 mmHg      Is BP treated: Yes      HDL Cholesterol: 43 mg/dL      Total Cholesterol: 192 mg/dL    (Imaging have been independently reviewed)  mmogram Birads 1 11/2023    Assessment:         1. Annual physical exam    2. BMI 33.0-33.9,adult    3. Class 2 severe obesity due to excess calories with serious comorbidity and body mass index (BMI) of 35.0 to 35.9 in adult    4. Benign essential HTN    5. HSV infection    6. At risk for side effect of medication    7. Hypertension, unspecified type              Plan:         Jerry was seen today for annual exam.    Diagnoses and all orders for this  visit:    Annual physical exam    BMI 33.0-33.9,adult  -     naltrexone-bupropion (CONTRAVE) 8-90 mg TbSR; Take 1 tab in AM  for 7 days, then 2 tabs in AM for 7 days, then 2 tabs in AM and 1 tab in PM for 7 days, then 2 tabs twice a day. Continue at that dose    Class 2 severe obesity due to excess calories with serious comorbidity and body mass index (BMI) of 35.0 to 35.9 in adult  -     naltrexone-bupropion (CONTRAVE) 8-90 mg TbSR; Take 1 tab in AM  for 7 days, then 2 tabs in AM for 7 days, then 2 tabs in AM and 1 tab in PM for 7 days, then 2 tabs twice a day. Continue at that dose    Benign essential HTN  Comments:  Improved  Cont meds, increase dose of HCTZ  Orders:  -     amLODIPine (NORVASC) 5 MG tablet; Take 1 tablet (5 mg total) by mouth once daily.  -     valsartan (DIOVAN) 320 MG tablet; Take 1 tablet (320 mg total) by mouth once daily.    HSV infection  -     valACYclovir (VALTREX) 1000 MG tablet; Take 2 tablets (2,000 mg total) by mouth 2 (two) times daily. For 1 day for cold sore outbreak.  Repeat as needed.    At risk for side effect of medication    Hypertension, unspecified type  -     carvediloL (COREG) 6.25 MG tablet; Take 1 tablet (6.25 mg total) by mouth 2 (two) times daily with meals.    Other orders  -     azelastine (ASTELIN) 137 mcg (0.1 %) nasal spray; 1 spray in both nostrils 2 times daily  The following orders have not been finalized:  -     Cancel: famotidine (PEPCID) 20 MG tablet  -     Cancel: meclizine (ANTIVERT) 12.5 mg tablet             Health Maintenance  - Lipids:   - A1C:   - Colon Ca Screen: cologuard 7/7/2023  - Immunizations:    Women's health  - Pap: follows with gyn in [place]. Request reqords  - Mammo: Birads 1 11/2023  - Dexa:  N/A   - Contraception: IUD    Follow up in about 3 months (around 6/15/2024) for weight f/u.          All medications were reviewed including potential side effects and risks/benefits.  Pt was counseled to call back if anything worsens or if  questions arise.    Manav Molina MD  Family Medicine  [facility]  [address] [facility] [address] [place] [address], [place] [address]  Phone [phone]  Fax [phone]

## 2024-03-16 ENCOUNTER — PATIENT MESSAGE (OUTPATIENT)
Dept: INTERNAL MEDICINE | Facility: CLINIC | Age: 49
End: 2024-03-16
Payer: COMMERCIAL

## 2024-03-16 DIAGNOSIS — E66.01 CLASS 2 SEVERE OBESITY DUE TO EXCESS CALORIES WITH SERIOUS COMORBIDITY AND BODY MASS INDEX (BMI) OF 35.0 TO 35.9 IN ADULT: ICD-10-CM

## 2024-03-18 VITALS — SYSTOLIC BLOOD PRESSURE: 109 MMHG | DIASTOLIC BLOOD PRESSURE: 83 MMHG

## 2024-03-20 LAB — H PYLORI AG STL QL IA: NOT DETECTED

## 2024-04-08 ENCOUNTER — PATIENT MESSAGE (OUTPATIENT)
Dept: INTERNAL MEDICINE | Facility: CLINIC | Age: 49
End: 2024-04-08
Payer: COMMERCIAL

## 2024-05-02 ENCOUNTER — PATIENT OUTREACH (OUTPATIENT)
Dept: ADMINISTRATIVE | Facility: HOSPITAL | Age: 49
End: 2024-05-02
Payer: COMMERCIAL

## 2024-09-16 ENCOUNTER — TELEPHONE (OUTPATIENT)
Dept: INTERNAL MEDICINE | Facility: CLINIC | Age: 49
End: 2024-09-16
Payer: COMMERCIAL

## 2024-09-16 NOTE — TELEPHONE ENCOUNTER
----- Message from Elmira Dan sent at 9/16/2024  8:42 AM CDT -----  Pt sent a message about wanting to reschedule an appt. I schedule the office for virtual. Pt would like to do in office but wanted something sooner than Jan 2024. Pt would like the office to give her a call back.               Pt can be reached at  [phone]                TY

## 2024-09-16 NOTE — TELEPHONE ENCOUNTER
MsJorge Kb would like to know if she can do a virtual visit on Sept 19th @ 0800 am.  Patient states that is her only available time due to work schedule.

## 2024-09-18 ENCOUNTER — OFFICE VISIT (OUTPATIENT)
Dept: INTERNAL MEDICINE | Facility: CLINIC | Age: 49
End: 2024-09-18
Payer: COMMERCIAL

## 2024-09-18 VITALS
BODY MASS INDEX: 30.66 KG/M2 | DIASTOLIC BLOOD PRESSURE: 83 MMHG | SYSTOLIC BLOOD PRESSURE: 109 MMHG | WEIGHT: 173.06 LBS | HEIGHT: 63 IN

## 2024-09-18 DIAGNOSIS — Z91.89 AT RISK FOR SIDE EFFECT OF MEDICATION: ICD-10-CM

## 2024-09-18 DIAGNOSIS — I10 HYPERTENSION, UNSPECIFIED TYPE: ICD-10-CM

## 2024-09-18 DIAGNOSIS — E66.01 CLASS 2 SEVERE OBESITY DUE TO EXCESS CALORIES WITH SERIOUS COMORBIDITY AND BODY MASS INDEX (BMI) OF 35.0 TO 35.9 IN ADULT: ICD-10-CM

## 2024-09-18 DIAGNOSIS — I10 BENIGN ESSENTIAL HTN: Chronic | ICD-10-CM

## 2024-09-18 DIAGNOSIS — J30.2 SEASONAL ALLERGIES: Primary | ICD-10-CM

## 2024-09-18 PROCEDURE — 3044F HG A1C LEVEL LT 7.0%: CPT | Mod: CPTII,95,, | Performed by: STUDENT IN AN ORGANIZED HEALTH CARE EDUCATION/TRAINING PROGRAM

## 2024-09-18 PROCEDURE — 3008F BODY MASS INDEX DOCD: CPT | Mod: CPTII,95,, | Performed by: STUDENT IN AN ORGANIZED HEALTH CARE EDUCATION/TRAINING PROGRAM

## 2024-09-18 PROCEDURE — 99213 OFFICE O/P EST LOW 20 MIN: CPT | Mod: 95,,, | Performed by: STUDENT IN AN ORGANIZED HEALTH CARE EDUCATION/TRAINING PROGRAM

## 2024-09-18 PROCEDURE — 3079F DIAST BP 80-89 MM HG: CPT | Mod: CPTII,95,, | Performed by: STUDENT IN AN ORGANIZED HEALTH CARE EDUCATION/TRAINING PROGRAM

## 2024-09-18 PROCEDURE — G2211 COMPLEX E/M VISIT ADD ON: HCPCS | Mod: 95,,, | Performed by: STUDENT IN AN ORGANIZED HEALTH CARE EDUCATION/TRAINING PROGRAM

## 2024-09-18 PROCEDURE — 3074F SYST BP LT 130 MM HG: CPT | Mod: CPTII,95,, | Performed by: STUDENT IN AN ORGANIZED HEALTH CARE EDUCATION/TRAINING PROGRAM

## 2024-09-18 PROCEDURE — 1159F MED LIST DOCD IN RCRD: CPT | Mod: CPTII,95,, | Performed by: STUDENT IN AN ORGANIZED HEALTH CARE EDUCATION/TRAINING PROGRAM

## 2024-09-18 PROCEDURE — 4010F ACE/ARB THERAPY RXD/TAKEN: CPT | Mod: CPTII,95,, | Performed by: STUDENT IN AN ORGANIZED HEALTH CARE EDUCATION/TRAINING PROGRAM

## 2024-09-18 RX ORDER — FAMOTIDINE 20 MG/1
20 TABLET, FILM COATED ORAL NIGHTLY PRN
Qty: 30 TABLET | Refills: 3 | Status: SHIPPED | OUTPATIENT
Start: 2024-09-18 | End: 2025-09-18

## 2024-09-18 RX ORDER — CARVEDILOL 6.25 MG/1
6.25 TABLET ORAL 2 TIMES DAILY WITH MEALS
Qty: 180 TABLET | Refills: 3 | Status: SHIPPED | OUTPATIENT
Start: 2024-09-18 | End: 2025-09-18

## 2024-09-18 RX ORDER — VALSARTAN 320 MG/1
320 TABLET ORAL DAILY
Qty: 90 TABLET | Refills: 3 | Status: SHIPPED | OUTPATIENT
Start: 2024-09-18

## 2024-09-18 RX ORDER — AZELASTINE 1 MG/ML
SPRAY, METERED NASAL
Qty: 30 ML | Refills: 6 | Status: SHIPPED | OUTPATIENT
Start: 2024-09-18

## 2024-09-18 RX ORDER — AMLODIPINE BESYLATE 5 MG/1
5 TABLET ORAL DAILY
Qty: 90 TABLET | Refills: 3 | Status: SHIPPED | OUTPATIENT
Start: 2024-09-18

## 2024-09-18 NOTE — PROGRESS NOTES
The patient location is: [place] work   The chief complaint leading to consultation is: HTN, med refill    Visit type: audiovisual    Face to Face time with patient: 10 min  15  minutes of total time spent on the encounter, which includes face to face time and non-face to face time preparing to see the patient (eg, review of tests), Obtaining and/or reviewing separately obtained history, Documenting clinical information in the electronic or other health record, Independently interpreting results (not separately reported) and communicating results to the patient/family/caregiver, or Care coordination (not separately reported).         Each patient to whom he or she provides medical services by telemedicine is:  (1) informed of the relationship between the physician and patient and the respective role of any other health care provider with respect to management of the patient; and (2) notified that he or she may decline to receive medical services by telemedicine and may withdraw from such care at any time.    Notes:       [facility]    Subjective:       Patient ID: Jerry Quiles is a 49 y.o. female.    Chief Complaint: f/u cold and Hypertension    History was obtained from the patient and supplemented through chart review.  This pt is known to me.    HPI:    Patient is a 49 y.o. female with chronic medical problems including vertigo, HTN, acquired hypothyroidism, obesity. Presents for BP f/u, weight f/u.      Wishes for weight loss, getting contrave, doing well, possible more irritibility  Refilled, taking 2 pills twice a day    HTN  controlled  Taking amlodipine 5, valsartan 320, coreg 6.25 BID,  Following up next week with portal messages    Obesity  Insurance stopped covering mounjaro, now taking contrave  Cont with [facility]    CBD at law firm.  Prefers to have care in [facility] due to decrease time away from work     Spin bike, has been exercising most nights    Wt Readings from Last 15 Encounters:    09/18/24 78.5 kg (173 lb 1 oz)   03/15/24 78.5 kg (173 lb 1 oz)   03/04/24 78.2 kg (172 lb 6.4 oz)   02/19/24 96.4 kg (212 lb 8.4 oz)   08/02/23 85.7 kg (189 lb)   06/06/23 85.8 kg (189 lb 2.5 oz)   10/31/22 85.8 kg (189 lb 2.5 oz)   03/31/21 86.8 kg (191 lb 5.8 oz)   11/17/20 87.3 kg (192 lb 7.4 oz)   11/10/20 86.4 kg (190 lb 7.6 oz)   10/26/20 85.9 kg (189 lb 6 oz)          Medical History  Past Medical History:   Diagnosis Date    HTN (hypertension) 2016    Was off of BP meds after sleeve. did not take meds when at home from Covid    Vertigo 06/2020    In ICU at [facility], checked for stroke       Review of Systems   Constitutional:  Negative for activity change and unexpected weight change.   HENT:  Negative for hearing loss, rhinorrhea and trouble swallowing.    Eyes:  Negative for discharge and visual disturbance.   Respiratory:  Negative for chest tightness and wheezing.    Cardiovascular:  Negative for chest pain and palpitations.   Gastrointestinal:  Negative for blood in stool, constipation, diarrhea and vomiting.   Endocrine: Negative for polydipsia and polyuria.   Genitourinary:  Negative for difficulty urinating, dysuria, hematuria and menstrual problem.   Musculoskeletal:  Negative for arthralgias, joint swelling and neck pain.   Neurological:  Negative for weakness and headaches.   Psychiatric/Behavioral:  Negative for confusion and dysphoric mood.          Surgical hx, family hx, social hx   No colon cancer, no breast cancer  Have been reviewed      Current Outpatient Medications:     ciclopirox (PENLAC) 8 % Soln, Apply topically nightly., Disp: 6.6 mL, Rfl: 11    meclizine (ANTIVERT) 12.5 mg tablet, Take 1 tablet (12.5 mg total) by mouth 3 (three) times daily as needed., Disp: 60 tablet, Rfl: 0    valACYclovir (VALTREX) 1000 MG tablet, Take 2 tablets (2,000 mg total) by mouth 2 (two) times daily. For 1 day for cold sore outbreak.  Repeat as needed., Disp: 30 tablet, Rfl: 0    amLODIPine (NORVASC) 5  "MG tablet, Take 1 tablet (5 mg total) by mouth once daily., Disp: 90 tablet, Rfl: 3    azelastine (ASTELIN) 137 mcg (0.1 %) nasal spray, 1 spray in both nostrils 2 times daily, Disp: 30 mL, Rfl: 6    carvediloL (COREG) 6.25 MG tablet, Take 1 tablet (6.25 mg total) by mouth 2 (two) times daily with meals., Disp: 180 tablet, Rfl: 3    famotidine (PEPCID) 20 MG tablet, Take 1 tablet (20 mg total) by mouth nightly as needed (while taking meloxicam)., Disp: 30 tablet, Rfl: 3    levonorgestreL (MIRENA) 21 mcg/24 hours (8 yrs) 52 mg IUD, 1 each by Intrauterine route., Disp: , Rfl:     naltrexone-bupropion (CONTRAVE) 8-90 mg TbSR, Take 2 tabs twice a day., Disp: 180 tablet, Rfl: 0    valsartan (DIOVAN) 320 MG tablet, Take 1 tablet (320 mg total) by mouth once daily., Disp: 90 tablet, Rfl: 3    Objective:        Body mass index is 30.66 kg/m².  Vitals:    09/18/24 0805   BP: 109/83   Weight: 78.5 kg (173 lb 1 oz)   Height: 5' 3" (1.6 m)   PainSc: 0-No pain       Physical Exam  Vitals and nursing note reviewed.   Constitutional:       General: She is not in acute distress.     Appearance: Normal appearance. She is not ill-appearing.   HENT:      Head: Normocephalic and atraumatic.   Eyes:      General: No scleral icterus.  Pulmonary:      Effort: Pulmonary effort is normal.   Musculoskeletal:         General: No deformity.   Neurological:      Mental Status: She is alert and oriented to person, place, and time.   Psychiatric:         Behavior: Behavior normal.           Lab Results   Component Value Date    WBC 7.60 03/13/2024    HGB 13.5 03/13/2024    HCT 41.8 03/13/2024     03/13/2024    CHOL 192 03/13/2024    TRIG 91 03/13/2024    HDL 43 03/13/2024    ALT 16 03/13/2024    AST 15 03/13/2024     03/13/2024    K 4.3 03/13/2024     03/13/2024    CREATININE 0.8 03/13/2024    BUN 11 03/13/2024    CO2 22 (L) 03/13/2024    TSH 3.338 03/13/2024    HGBA1C 5.2 03/13/2024       The 10-year ASCVD risk score (Renee " SADIE, et al., 2019) is: 1.4%    Values used to calculate the score:      Age: 49 years      Sex: Female      Is Non- : No      Diabetic: No      Tobacco smoker: No      Systolic Blood Pressure: 109 mmHg      Is BP treated: Yes      HDL Cholesterol: 43 mg/dL      Total Cholesterol: 192 mg/dL    (Imaging have been independently reviewed)  mmogram Birads 1 11/2023    Assessment:         1. Seasonal allergies    2. Benign essential HTN    3. BMI 33.0-33.9,adult    4. Class 2 severe obesity due to excess calories with serious comorbidity and body mass index (BMI) of 35.0 to 35.9 in adult    5. Hypertension, unspecified type    6. At risk for side effect of medication                Plan:         Jerry was seen today for cold and hypertension.    Diagnoses and all orders for this visit:    Seasonal allergies  -     azelastine (ASTELIN) 137 mcg (0.1 %) nasal spray; 1 spray in both nostrils 2 times daily    Benign essential HTN  Comments:  Improved  Cont meds, increase dose of HCTZ  Orders:  -     amLODIPine (NORVASC) 5 MG tablet; Take 1 tablet (5 mg total) by mouth once daily.  -     valsartan (DIOVAN) 320 MG tablet; Take 1 tablet (320 mg total) by mouth once daily.    BMI 33.0-33.9,adult  -     naltrexone-bupropion (CONTRAVE) 8-90 mg TbSR; Take 2 tabs twice a day.    Class 2 severe obesity due to excess calories with serious comorbidity and body mass index (BMI) of 35.0 to 35.9 in adult  -     naltrexone-bupropion (CONTRAVE) 8-90 mg TbSR; Take 2 tabs twice a day.    Hypertension, unspecified type  -     carvediloL (COREG) 6.25 MG tablet; Take 1 tablet (6.25 mg total) by mouth 2 (two) times daily with meals.    At risk for side effect of medication  -     famotidine (PEPCID) 20 MG tablet; Take 1 tablet (20 mg total) by mouth nightly as needed (while taking meloxicam).               Health Maintenance  - Lipids:   - A1C:   - Colon Ca Screen: ashleigh 7/7/2023  - Immunizations:    Women's health  -  Pap: follows with gyn in [place]. Request reqords  - Mammo: Birads 1 11/2023  - Dexa:  N/A   - Contraception: IUD    Follow up in about 6 months (around 3/18/2025) for annual or sooner if needed.      Visit today is associated with current or anticipated ongoing medical care related to this patient's single serious condition/complex condition of weight loss efforts, HTN, mood changes as med AE. The patient will return to see me as these issues will be followed longitudinally.      All medications were reviewed including potential side effects and risks/benefits.  Pt was counseled to call back if anything worsens or if questions arise.    Manav Molina MD  Family Medicine  [facility]  [address] [address]  Phone [phone]  Fax [phone]

## 2024-09-24 ENCOUNTER — PATIENT MESSAGE (OUTPATIENT)
Dept: INTERNAL MEDICINE | Facility: CLINIC | Age: 49
End: 2024-09-24
Payer: COMMERCIAL

## 2024-09-24 NOTE — TELEPHONE ENCOUNTER
Pt stated she had a virtual visit w/provider  on 9/18/2024 stated provider may have forgotten the request about calling in a prescription for the congestion.      Pt asked if a prescription could be sent in to her pharmacy. Stated that if she needed to be seen she would probably have to go to urgent care. .

## 2024-11-14 ENCOUNTER — HOSPITAL ENCOUNTER (OUTPATIENT)
Dept: RADIOLOGY | Facility: HOSPITAL | Age: 49
Discharge: HOME | End: 2024-11-14
Attending: STUDENT IN AN ORGANIZED HEALTH CARE EDUCATION/TRAINING PROGRAM
Payer: COMMERCIAL

## 2024-11-14 DIAGNOSIS — Z12.31 ENCOUNTER FOR SCREENING MAMMOGRAM FOR BREAST CANCER: ICD-10-CM

## 2024-11-14 PROCEDURE — 77067 SCR MAMMO BI INCL CAD: CPT | Mod: TC

## 2024-12-03 ENCOUNTER — TELEPHONE (OUTPATIENT)
Dept: INTERNAL MEDICINE | Facility: CLINIC | Age: 49
End: 2024-12-03
Payer: COMMERCIAL

## 2024-12-03 NOTE — TELEPHONE ENCOUNTER
----- Message from Manav sent at 12/3/2024  3:55 PM CST -----  Regarding: Wyatt    Type: Patient Call Back    Who called: [employer]    What is the request in detail:    Can the clinic reply by [facility]? No    Would the patient rather a call back or a response via [facility]? Call back    Best call back number: [phone]    Additional Information: Med Authorization Verification    Thank you.

## 2025-01-14 ENCOUNTER — PATIENT MESSAGE (OUTPATIENT)
Dept: INTERNAL MEDICINE | Facility: CLINIC | Age: 50
End: 2025-01-14
Payer: COMMERCIAL

## 2025-01-14 DIAGNOSIS — Z00.00 ANNUAL PHYSICAL EXAM: ICD-10-CM

## 2025-01-14 DIAGNOSIS — E03.9 ACQUIRED HYPOTHYROIDISM: Primary | ICD-10-CM

## 2025-01-17 ENCOUNTER — LAB VISIT (OUTPATIENT)
Dept: LAB | Facility: HOSPITAL | Age: 50
End: 2025-01-17
Attending: STUDENT IN AN ORGANIZED HEALTH CARE EDUCATION/TRAINING PROGRAM
Payer: COMMERCIAL

## 2025-01-17 DIAGNOSIS — E03.9 ACQUIRED HYPOTHYROIDISM: ICD-10-CM

## 2025-01-17 DIAGNOSIS — Z00.00 ANNUAL PHYSICAL EXAM: ICD-10-CM

## 2025-01-17 LAB
ALBUMIN SERPL BCP-MCNC: 4.2 G/DL (ref 3.5–5.2)
ALP SERPL-CCNC: 59 U/L (ref 38–126)
ALT SERPL W/O P-5'-P-CCNC: 19 U/L (ref 10–44)
ANION GAP SERPL CALC-SCNC: 7 MMOL/L (ref 8–16)
AST SERPL-CCNC: 25 U/L (ref 15–46)
BASOPHILS # BLD AUTO: 0.05 K/UL (ref 0–0.2)
BASOPHILS NFR BLD: 0.6 % (ref 0–1.9)
BILIRUB SERPL-MCNC: 0.7 MG/DL (ref 0.1–1)
CALCIUM SERPL-MCNC: 9.3 MG/DL (ref 8.7–10.5)
CHLORIDE SERPL-SCNC: 105 MMOL/L (ref 95–110)
CHOLEST SERPL-MCNC: 199 MG/DL (ref 120–199)
CHOLEST/HDLC SERPL: 4.4 {RATIO} (ref 2–5)
CO2 SERPL-SCNC: 26 MMOL/L (ref 23–29)
CREAT SERPL-MCNC: 0.9 MG/DL (ref 0.5–1.4)
DIFFERENTIAL METHOD BLD: NORMAL
EOSINOPHIL # BLD AUTO: 0.1 K/UL (ref 0–0.5)
EOSINOPHIL NFR BLD: 1.1 % (ref 0–8)
ERYTHROCYTE [DISTWIDTH] IN BLOOD BY AUTOMATED COUNT: 12.2 % (ref 11.5–14.5)
EST. GFR  (NO RACE VARIABLE): >60 ML/MIN/1.73 M^2
ESTIMATED AVG GLUCOSE: 108 MG/DL (ref 68–131)
GLUCOSE SERPL-MCNC: 111 MG/DL (ref 70–110)
HBA1C MFR BLD: 5.4 % (ref 4–5.6)
HCT VFR BLD AUTO: 44.8 % (ref 37–48.5)
HDLC SERPL-MCNC: 45 MG/DL (ref 40–75)
HDLC SERPL: 22.6 % (ref 20–50)
HGB BLD-MCNC: 14.7 G/DL (ref 12–16)
IMM GRANULOCYTES # BLD AUTO: 0.02 K/UL (ref 0–0.04)
IMM GRANULOCYTES NFR BLD AUTO: 0.3 % (ref 0–0.5)
LDLC SERPL CALC-MCNC: 137.4 MG/DL (ref 63–159)
LYMPHOCYTES # BLD AUTO: 1.7 K/UL (ref 1–4.8)
LYMPHOCYTES NFR BLD: 20.8 % (ref 18–48)
MCH RBC QN AUTO: 30.9 PG (ref 27–31)
MCHC RBC AUTO-ENTMCNC: 32.8 G/DL (ref 32–36)
MCV RBC AUTO: 94 FL (ref 82–98)
MONOCYTES # BLD AUTO: 0.6 K/UL (ref 0.3–1)
MONOCYTES NFR BLD: 8 % (ref 4–15)
NEUTROPHILS # BLD AUTO: 5.5 K/UL (ref 1.8–7.7)
NEUTROPHILS NFR BLD: 69.2 % (ref 38–73)
NONHDLC SERPL-MCNC: 154 MG/DL
NRBC BLD-RTO: 0 /100 WBC
PLATELET # BLD AUTO: 381 K/UL (ref 150–450)
PMV BLD AUTO: 10.2 FL (ref 9.2–12.9)
POTASSIUM SERPL-SCNC: 4.5 MMOL/L (ref 3.5–5.1)
PROT SERPL-MCNC: 8 G/DL (ref 6–8.4)
RBC # BLD AUTO: 4.75 M/UL (ref 4–5.4)
SODIUM SERPL-SCNC: 138 MMOL/L (ref 136–145)
TRIGL SERPL-MCNC: 83 MG/DL (ref 30–150)
TSH SERPL DL<=0.005 MIU/L-ACNC: 2.04 UIU/ML (ref 0.4–4)
UUN UR-MCNC: 14 MG/DL (ref 7–17)
WBC # BLD AUTO: 7.99 K/UL (ref 3.9–12.7)

## 2025-01-17 PROCEDURE — 80061 LIPID PANEL: CPT | Performed by: STUDENT IN AN ORGANIZED HEALTH CARE EDUCATION/TRAINING PROGRAM

## 2025-01-17 PROCEDURE — 85025 COMPLETE CBC W/AUTO DIFF WBC: CPT | Mod: PN | Performed by: STUDENT IN AN ORGANIZED HEALTH CARE EDUCATION/TRAINING PROGRAM

## 2025-01-17 PROCEDURE — 80053 COMPREHEN METABOLIC PANEL: CPT | Mod: PN | Performed by: STUDENT IN AN ORGANIZED HEALTH CARE EDUCATION/TRAINING PROGRAM

## 2025-01-17 PROCEDURE — 36415 COLL VENOUS BLD VENIPUNCTURE: CPT | Mod: PN | Performed by: STUDENT IN AN ORGANIZED HEALTH CARE EDUCATION/TRAINING PROGRAM

## 2025-01-17 PROCEDURE — 84443 ASSAY THYROID STIM HORMONE: CPT | Mod: PN | Performed by: STUDENT IN AN ORGANIZED HEALTH CARE EDUCATION/TRAINING PROGRAM

## 2025-01-17 PROCEDURE — 83036 HEMOGLOBIN GLYCOSYLATED A1C: CPT | Performed by: STUDENT IN AN ORGANIZED HEALTH CARE EDUCATION/TRAINING PROGRAM

## 2025-03-17 ENCOUNTER — PATIENT MESSAGE (OUTPATIENT)
Dept: INTERNAL MEDICINE | Facility: CLINIC | Age: 50
End: 2025-03-17
Payer: COMMERCIAL

## 2025-03-19 ENCOUNTER — PATIENT MESSAGE (OUTPATIENT)
Dept: INTERNAL MEDICINE | Facility: CLINIC | Age: 50
End: 2025-03-19

## 2025-03-19 ENCOUNTER — OFFICE VISIT (OUTPATIENT)
Dept: INTERNAL MEDICINE | Facility: CLINIC | Age: 50
End: 2025-03-19
Payer: COMMERCIAL

## 2025-03-19 ENCOUNTER — HOSPITAL ENCOUNTER (OUTPATIENT)
Dept: RADIOLOGY | Facility: OTHER | Age: 50
Discharge: HOME | End: 2025-03-19
Payer: COMMERCIAL

## 2025-03-19 VITALS
DIASTOLIC BLOOD PRESSURE: 92 MMHG | HEIGHT: 63 IN | HEART RATE: 89 BPM | BODY MASS INDEX: 34.18 KG/M2 | WEIGHT: 192.88 LBS | SYSTOLIC BLOOD PRESSURE: 142 MMHG | RESPIRATION RATE: 18 BRPM

## 2025-03-19 DIAGNOSIS — M25.511 ACUTE PAIN OF RIGHT SHOULDER: ICD-10-CM

## 2025-03-19 DIAGNOSIS — M79.672 LEFT FOOT PAIN: ICD-10-CM

## 2025-03-19 DIAGNOSIS — Z86.79 HISTORY OF PAROXYSMAL SUPRAVENTRICULAR TACHYCARDIA: ICD-10-CM

## 2025-03-19 DIAGNOSIS — E66.811 CLASS 1 OBESITY WITH SERIOUS COMORBIDITY AND BODY MASS INDEX (BMI) OF 34.0 TO 34.9 IN ADULT, UNSPECIFIED OBESITY TYPE: ICD-10-CM

## 2025-03-19 DIAGNOSIS — E03.9 ACQUIRED HYPOTHYROIDISM: ICD-10-CM

## 2025-03-19 DIAGNOSIS — Z00.00 VISIT FOR ANNUAL HEALTH EXAMINATION: Primary | ICD-10-CM

## 2025-03-19 DIAGNOSIS — I10 PRIMARY HYPERTENSION: ICD-10-CM

## 2025-03-19 DIAGNOSIS — G57.62 MORTON'S NEUROMA OF LEFT FOOT: ICD-10-CM

## 2025-03-19 PROCEDURE — 73030 X-RAY EXAM OF SHOULDER: CPT | Mod: 26,RT,, | Performed by: RADIOLOGY

## 2025-03-19 PROCEDURE — 1159F MED LIST DOCD IN RCRD: CPT | Mod: CPTII,S$GLB,,

## 2025-03-19 PROCEDURE — 3079F DIAST BP 80-89 MM HG: CPT | Mod: CPTII,S$GLB,,

## 2025-03-19 PROCEDURE — 73630 X-RAY EXAM OF FOOT: CPT | Mod: 26,LT,, | Performed by: RADIOLOGY

## 2025-03-19 PROCEDURE — 1160F RVW MEDS BY RX/DR IN RCRD: CPT | Mod: CPTII,S$GLB,,

## 2025-03-19 PROCEDURE — 3044F HG A1C LEVEL LT 7.0%: CPT | Mod: CPTII,S$GLB,,

## 2025-03-19 PROCEDURE — 3008F BODY MASS INDEX DOCD: CPT | Mod: CPTII,S$GLB,,

## 2025-03-19 PROCEDURE — 3077F SYST BP >= 140 MM HG: CPT | Mod: CPTII,S$GLB,,

## 2025-03-19 PROCEDURE — 99999 PR PBB SHADOW E&M-EST. PATIENT-LVL V: CPT | Mod: PBBFAC,,,

## 2025-03-19 PROCEDURE — 73630 X-RAY EXAM OF FOOT: CPT | Mod: TC,FY,LT

## 2025-03-19 PROCEDURE — 99396 PREV VISIT EST AGE 40-64: CPT | Mod: S$GLB,,,

## 2025-03-19 PROCEDURE — 73030 X-RAY EXAM OF SHOULDER: CPT | Mod: TC,FY,RT

## 2025-03-19 RX ORDER — DICLOFENAC SODIUM 10 MG/G
2 GEL TOPICAL 2 TIMES DAILY PRN
Qty: 100 G | Refills: 1 | Status: SHIPPED | OUTPATIENT
Start: 2025-03-19

## 2025-03-19 NOTE — ASSESSMENT & PLAN NOTE
BP not at goal in clinic but pt has not taken morning meds. Admits controlled with meds. Continue prescribed meds for now. Consider adding hctz 12.5mg for swelling concerns but will need to check labs.

## 2025-03-19 NOTE — PROGRESS NOTES
HPI     Chief Complaint:  No chief complaint on file.      Jerry Quiles is a 49 y.o. female with multiple medical diagnoses as listed in the medical history and problem list that presents for No chief complaint on file.  .   Patient is not known to me with her last appointment in this department on Visit date not found.  LOV 2024  Last annual 3/2024 with Dr. Molina    HPI    Annual    HTN - BP controlled at home on medication. Usually 120/80. Did not take meds today because focused on leaving for appt. Lives in [facility], traffic was bad. BP was not controlled last year so changed losartan to valsartan. Currently on Valsartan 320mg, coreg 6.25 bid, amlodipine 10mg. Stopped hctz because sodium/potassium was low? Did need potassium supplements. Pt admits does have mild swelling to ankles in the evening. Does have finger swelling in the morning after excessive salty dinner.     Weight gain - has gained weight recently. Checked thyroid, normal. Good diet. Sometimes will go all day without eating and then will eat supper at night (deer meat). PMH of gastric sleeve in , was on mounjaro and weight decreased to 150 lbs but insurance stopped covering. Started on Contrave, stopped but recently restarted approx 6 weeks ago. No exercise. Works 2 jobs. Has lunch hour at work but usually can't take it because too busy. Does have treadmill under desk but can't use because of foot pain. Gets enough protein. Eats very small meals because of surgery. Does not overeat.  Wt Readings from Last 3 Encounters:   25 0915 87.5 kg (192 lb 14.4 oz)   24 0805 78.5 kg (173 lb 1 oz)   03/15/24 0904 78.5 kg (173 lb 1 oz)     Thyroid - was on thryoids meds prior to sleeve but stopped after that. Recent thyroid was normal. Gained 40 lbs in 1 year since stopping mounjaro.     Foot pain - pain to bottom of foot. Went to  that was too tight. Since then hasn't been able to walk on foot since December. Thought it was PT but in  "ball of foot. Very sensitive to touch. Better with tennis shoes and thick soles. Can't go barefoot.     Shoulder pain- right shoulder. Does a lot of typing. Worse with movement. Pain to right shoulder will radiates down arm. No numbness/tingling. Has tried meloxicam for shoulder pain but has to take with pepcid. No relief.     Social Factors  Tobacco use: No  Ready to Quit: n/a  Alcohol: Yes socially 1-2 per month  Intimate partner violence screening  "Do you feel safe in your current relationship?" Yes   Regular Exercise: no    Depression  Over the past two weeks, have you felt down, depressed, or hopeless? No  Over the past two weeks, have you felt little interest or pleasure in doing things? No    Reproductive Health  Last menstrual period irregular, IUD  Patient is sexually active.   Contraception: IUD  STD screening in last year: declines  Last PAP: 2023, f/b Dr. Ball routinely  HIV screening: neg    CHD, HTN, DM2  CHD Risk Factors: hypertension, obesity (BMI >= 30 kg/m2), and sedentary lifestyle  Women 45 years and older should be screened for dyslipidemia if at increased risk of CHD  Women 20 to 45 years of age should be screened for dyslipidemia if at increased risk of CHD  Asymptomatic adults with sustained blood pressure greater than 135/80 mm Hg (treated or untreated) should be screened for type 2 diabetes mellitus    Estimated body mass index is 34.17 kg/m² as calculated from the following:    Height as of this encounter: 5' 3" (1.6 m).    Weight as of this encounter: 87.5 kg (192 lb 14.4 oz).    Screening  Mammogram needed: due in 11/2025  Colonoscopy needed: cologuard 2026  Osteoporosis screen needed: age     Women 50 to 74 years of age should be screened for breast cancer with mammography biennially.  Women should be screened for cervical cancer with Pap tests beginning at 21 years of age. Low-risk women should receive Pap testing every three years. Co-testing for human papillomavirus is an " option beginning at 30 years of age, and can extend the screening interval to five years. Cervical cancer screening should be discontinued at 65 years of age or after total hysterectomy if the woman has a benign gynecologic history  Adults 50 to 75 years of age should be screened for colorectal cancer with an FOBT annually, sigmoidoscopy every five years with an FOBT every three years, or colonoscopy every 10 years.  Women 65 years and older should be screened for osteoporosis. Women younger than 65 years should be screened if the risk of fracture is greater than or equal to that of a 65-year-old white woman without additional risk factors.    Assessment & Plan       1. Visit for annual health examination  Counseled on age appropriate medical preventative services including age appropriate cancer screenings, age appropriate eye and dental exams, over all nutritional health, need for a consistent exercise regimen, and an over all push towards maintaining a vigorous and active lifestyle.  Counseled on age appropriate vaccines and discussed upcoming health care needs based on age/gender. Discussed good sleep hygiene and stress management.    2. Hale's neuroma of left foot  Supportive shoes  Xray today  F/u with podiatry  -     Ambulatory referral/consult to Podiatry; Future; Expected date: 03/26/2025    3. Primary hypertension  Assessment & Plan:  BP not at goal in clinic but pt has not taken morning meds. Admits controlled with meds. Continue prescribed meds for now. Consider adding hctz 12.5mg for swelling concerns but will need to check labs.       4. Acquired hypothyroidism  Assessment & Plan:  Lab Results   Component Value Date    TSH 2.040 01/17/2025     No meds. Will continue to monitor. The current medical regimen is effective;  continue present plan and medications.        5. History of paroxysmal supraventricular tachycardia  Assessment & Plan:  History. No recurrence since ablation.       6. Left foot  pain  Xray today  Supportive shoes  F/u with podiatry  -     X-Ray Foot Complete Left; Future; Expected date: 03/19/2025    7. Acute pain of right shoulder  Muscular tenderness noted, ROM intact  Xray today  Topical NSAID  Consider PT or ortho refer pending results  -     X-ray Shoulder 2 or More Views Right; Future; Expected date: 03/19/2025  -     diclofenac sodium (VOLTAREN) 1 % Gel; Apply 2 g topically 2 (two) times daily as needed (pain).  Dispense: 100 g; Refill: 1    8. Class 1 obesity with serious comorbidity and body mass index (BMI) of 34.0 to 34.9 in adult, unspecified obesity type  Assessment & Plan:  Continue Cotrave for now. Work on increases movement/exercise. F/u with PCP as needed            --------------------------------------------      Health Maintenance:  Health Maintenance         Date Due Completion Date    COVID-19 Vaccine (1 - 2024-25 season) Never done ---    TETANUS VACCINE 03/19/2026 (Originally 8/[birth year]/1993) ---    Mammogram 11/14/2025 11/14/2024    Cervical Cancer Screening 02/15/2026 2/15/2023    Colorectal Cancer Screening 07/27/2026 7/27/2023    Hemoglobin A1c (Diabetic Prevention Screening) 01/17/2028 1/17/2025    Lipid Panel 01/17/2030 1/17/2025    RSV Vaccine (Age 60+ and Pregnant patients) (1 - 1-dose 75+ series) 08/12/2050 ---            Health maintenance reviewed    Follow Up:  Follow up in about 6 months (around 9/19/2025) for appt with PCP.    Discussed DDx, condition, and treatment.   Education sent to patient portal/included in after visit summary.  ED precautions given.   Notify provider if symptoms do not resolve or increase in severity.   Patient verbalizes understanding and agrees with plan of care.    Exam     Review of Systems:  (as noted above)  Review of Systems    Physical Exam:   Physical Exam  Constitutional:       General: She is not in acute distress.     Appearance: Normal appearance. She is obese. She is not ill-appearing, toxic-appearing or diaphoretic.    HENT:      Head: Normocephalic and atraumatic.   Eyes:      General: No scleral icterus.     Conjunctiva/sclera: Conjunctivae normal.   Neck:      Thyroid: No thyroid mass, thyromegaly or thyroid tenderness.   Cardiovascular:      Rate and Rhythm: Normal rate and regular rhythm.      Pulses: Normal pulses.           Dorsalis pedis pulses are 2+ on the left side.        Posterior tibial pulses are 2+ on the left side.      Heart sounds: Normal heart sounds. No murmur heard.  Pulmonary:      Effort: Pulmonary effort is normal. No respiratory distress.      Breath sounds: Normal breath sounds. No wheezing.   Abdominal:      General: Bowel sounds are normal.      Palpations: Abdomen is soft. There is no hepatomegaly or mass.      Tenderness: There is no abdominal tenderness. There is no right CVA tenderness or left CVA tenderness. Negative signs include Molina's sign.   Musculoskeletal:         General: No swelling. Normal range of motion.      Cervical back: Normal range of motion and neck supple. No rigidity.      Right lower leg: No edema.      Left lower leg: No edema.        Feet:    Feet:      Left foot:      Skin integrity: Skin integrity normal. No skin breakdown, erythema, warmth, callus or dry skin.      Toenail Condition: Fungal disease present.     Comments: Red - bruising underneath nail bed    Blue - TTP, no swelling/redness noted on exam but pt reports swelling occurs at end of night  Lymphadenopathy:      Cervical: No cervical adenopathy.   Skin:     General: Skin is warm.      Capillary Refill: Capillary refill takes less than 2 seconds.      Findings: No bruising or erythema.   Neurological:      General: No focal deficit present.      Mental Status: She is alert and oriented to person, place, and time.      Gait: Gait normal.   Psychiatric:         Mood and Affect: Mood normal.       Vitals:    03/19/25 0915 03/19/25 1004   BP: (!) 159/89 (!) 142/92   BP Location:  Right arm   Patient Position:  "Sitting Sitting   Pulse: 89    Resp: 18    Weight: 87.5 kg (192 lb 14.4 oz)    Height: 5' 3" (1.6 m)       Body mass index is 34.17 kg/m².    Lab Results   Component Value Date    WBC 7.99 01/17/2025    HGB 14.7 01/17/2025    HCT 44.8 01/17/2025     01/17/2025    CHOL 199 01/17/2025    TRIG 83 01/17/2025    HDL 45 01/17/2025    ALT 19 01/17/2025    AST 25 01/17/2025     01/17/2025    K 4.5 01/17/2025     01/17/2025    CREATININE 0.90 01/17/2025    BUN 14 01/17/2025    CO2 26 01/17/2025    TSH 2.040 01/17/2025    HGBA1C 5.4 01/17/2025       The 10-year ASCVD risk score (Renee NOEL, et al., 2019) is: 2.4%    Values used to calculate the score:      Age: 49 years      Sex: Female      Is Non- : No      Diabetic: No      Tobacco smoker: No      Systolic Blood Pressure: 142 mmHg      Is BP treated: Yes      HDL Cholesterol: 45 mg/dL      Total Cholesterol: 199 mg/dL    (Imaging have been independently reviewed)    History     Past Medical History:  Past Medical History:   Diagnosis Date    HTN (hypertension) 2016    Was off of BP meds after sleeve. did not take meds when at home from Covid    Vertigo 06/2020    In ICU at [facility], checked for stroke       Past Surgical History:  Past Surgical History:   Procedure Laterality Date    ABLATION  2006    for SVT    CHOLECYSTECTOMY  2001    No complications    EYE SURGERY  1998    RK, no complications    gastric sleeve  2012    REDUCTION OF BOTH BREASTS  1995    at age 20    TOTAL REDUCTION MAMMOPLASTY      WISDOM TOOTH EXTRACTION      20 yrs old       Social History:  Social History[1]    Family History:  Family History   Problem Relation Name Age of Onset    Hypertension Mother          meds    Diabetes type II Father      No Known Problems Sister      No Known Problems Brother      No Known Problems Maternal Grandmother      Cancer Maternal Grandfather          her mom was 15 yo    Heart failure Paternal Grandmother      Heart failure " Paternal Grandfather      No Known Problems Brother      No Known Problems Brother         Allergies and Medications: (updated and reviewed)  Review of patient's allergies indicates:   Allergen Reactions    Oxaprozin Hives and Swelling     Current Medications[2]    Patient Care Team:  Manav Molina MD as PCP - General (Family Medicine)  Ingrid Dorsey MD as Obstetrician (Obstetrics)         - The patient is given an After Visit Summary that lists all medications with directions, allergies, education, orders placed during this encounter and follow-up instructions.      - I have reviewed the patient's medical information including past medical, family, and social history sections including the medications and allergies.      - We discussed the patient's current medications.     This note was created by combination of typed  and MModal dictation.  Transcription errors may be present.  If there are any questions, please contact me.                 [1]   Social History  Socioeconomic History    Marital status:      Spouse name: Adrien    Number of children: 1    Years of education: 15    Highest education level: Associate degree: occupational, technical, or vocational program   Occupational History    Occupation:      Comment: law firm   Tobacco Use    Smoking status: Never    Smokeless tobacco: Never   Substance and Sexual Activity    Alcohol use: Yes     Comment: socially    Drug use: Never    Sexual activity: Yes     Partners: Male   Social History Narrative     1998- Robert. One child together and he has one.     Social Drivers of Health     Financial Resource Strain: Low Risk  (3/18/2025)    Overall Financial Resource Strain (CARDIA)     Difficulty of Paying Living Expenses: Not hard at all   Food Insecurity: No Food Insecurity (3/18/2025)    Hunger Vital Sign     Worried About Running Out of Food in the Last Year: Never true     Ran Out of Food in the Last Year:  Never true   Transportation Needs: No Transportation Needs (3/18/2025)    PRAPARE - Transportation     Lack of Transportation (Medical): No     Lack of Transportation (Non-Medical): No   Physical Activity: Inactive (3/18/2025)    Exercise Vital Sign     Days of Exercise per Week: 0 days     Minutes of Exercise per Session: 0 min   Stress: No Stress Concern Present (3/18/2025)    [employer] - Occupational Stress Questionnaire     Feeling of Stress : Not at all   Housing Stability: Low Risk  (3/18/2025)    Housing Stability Vital Sign     Unable to Pay for Housing in the Last Year: No     Number of Times Moved in the Last Year: 0     Homeless in the Last Year: No   [2]   Current Outpatient Medications   Medication Sig Dispense Refill    amLODIPine (NORVASC) 5 MG tablet Take 1 tablet (5 mg total) by mouth once daily. 90 tablet 3    azelastine (ASTELIN) 137 mcg (0.1 %) nasal spray 1 spray in both nostrils 2 times daily 30 mL 6    carvediloL (COREG) 6.25 MG tablet Take 1 tablet (6.25 mg total) by mouth 2 (two) times daily with meals. 180 tablet 3    ciclopirox (PENLAC) 8 % Soln Apply topically nightly. 6.6 mL 11    diclofenac sodium (VOLTAREN) 1 % Gel Apply 2 g topically 2 (two) times daily as needed (pain). 100 g 1    famotidine (PEPCID) 20 MG tablet Take 1 tablet (20 mg total) by mouth nightly as needed (while taking meloxicam). 30 tablet 3    levonorgestreL (MIRENA) 21 mcg/24 hours (8 yrs) 52 mg IUD 1 each by Intrauterine route.      meclizine (ANTIVERT) 12.5 mg tablet Take 1 tablet (12.5 mg total) by mouth 3 (three) times daily as needed. 60 tablet 0    naltrexone-bupropion (CONTRAVE) 8-90 mg TbSR Take 2 tabs twice a day. 180 tablet 0    valACYclovir (VALTREX) 1000 MG tablet Take 2 tablets (2,000 mg total) by mouth 2 (two) times daily. For 1 day for cold sore outbreak.  Repeat as needed. 30 tablet 0    valsartan (DIOVAN) 320 MG tablet Take 1 tablet (320 mg total) by mouth once daily. 90  tablet 3     No current facility-administered medications for this visit.

## 2025-03-19 NOTE — ASSESSMENT & PLAN NOTE
Lab Results   Component Value Date    TSH 2.040 01/17/2025     No meds. Will continue to monitor. The current medical regimen is effective;  continue present plan and medications.

## 2025-03-19 NOTE — PATIENT INSTRUCTIONS
Great meeting you, just to review:    BP check in 2 weeks (virtual with me). Continue medication. Monitor sodium in diet. Compression and elevation for leg swelling. Consider adding 12.5mg hctz but will need to check labs to ensure potassium/NA stable.     Weight - continue Contrave for now and try and work on increasing movement.     Xray today of foot and shoulder - schedule with podiatry. Consider physical therapy or ortho referral. Try topical NSAID for shoulder pain.

## 2025-03-20 ENCOUNTER — RESULTS FOLLOW-UP (OUTPATIENT)
Dept: INTERNAL MEDICINE | Facility: CLINIC | Age: 50
End: 2025-03-20

## 2025-03-20 VITALS — SYSTOLIC BLOOD PRESSURE: 118 MMHG | DIASTOLIC BLOOD PRESSURE: 77 MMHG

## 2025-03-20 DIAGNOSIS — B00.9 HSV INFECTION: ICD-10-CM

## 2025-03-20 DIAGNOSIS — J30.2 SEASONAL ALLERGIES: ICD-10-CM

## 2025-03-20 DIAGNOSIS — I10 BENIGN ESSENTIAL HTN: Chronic | ICD-10-CM

## 2025-03-20 DIAGNOSIS — I10 HYPERTENSION, UNSPECIFIED TYPE: ICD-10-CM

## 2025-03-20 RX ORDER — VALSARTAN 320 MG/1
320 TABLET ORAL DAILY
Qty: 90 TABLET | Refills: 3 | Status: SHIPPED | OUTPATIENT
Start: 2025-03-20

## 2025-03-20 RX ORDER — AMLODIPINE BESYLATE 5 MG/1
5 TABLET ORAL DAILY
Qty: 90 TABLET | Refills: 3 | Status: SHIPPED | OUTPATIENT
Start: 2025-03-20

## 2025-03-20 RX ORDER — VALACYCLOVIR HYDROCHLORIDE 1 G/1
2000 TABLET, FILM COATED ORAL 2 TIMES DAILY
Qty: 30 TABLET | Refills: 0 | Status: SHIPPED | OUTPATIENT
Start: 2025-03-20

## 2025-03-20 RX ORDER — AZELASTINE 1 MG/ML
SPRAY, METERED NASAL
Qty: 30 ML | Refills: 6 | Status: SHIPPED | OUTPATIENT
Start: 2025-03-20

## 2025-03-20 RX ORDER — CARVEDILOL 6.25 MG/1
6.25 TABLET ORAL 2 TIMES DAILY WITH MEALS
Qty: 180 TABLET | Refills: 3 | Status: SHIPPED | OUTPATIENT
Start: 2025-03-20 | End: 2026-03-20

## 2025-03-26 ENCOUNTER — OFFICE VISIT (OUTPATIENT)
Dept: PODIATRY | Facility: CLINIC | Age: 50
End: 2025-03-26
Payer: COMMERCIAL

## 2025-03-26 ENCOUNTER — PATIENT MESSAGE (OUTPATIENT)
Dept: PODIATRY | Facility: CLINIC | Age: 50
End: 2025-03-26

## 2025-03-26 VITALS
HEART RATE: 86 BPM | HEIGHT: 63 IN | WEIGHT: 192.88 LBS | BODY MASS INDEX: 34.18 KG/M2 | DIASTOLIC BLOOD PRESSURE: 111 MMHG | SYSTOLIC BLOOD PRESSURE: 183 MMHG

## 2025-03-26 DIAGNOSIS — G57.62 MORTON'S NEUROMA OF LEFT FOOT: ICD-10-CM

## 2025-03-26 DIAGNOSIS — M79.672 FOOT PAIN, LEFT: ICD-10-CM

## 2025-03-26 DIAGNOSIS — M24.573 EQUINUS CONTRACTURE OF ANKLE: ICD-10-CM

## 2025-03-26 DIAGNOSIS — M77.9 CAPSULITIS: Primary | ICD-10-CM

## 2025-03-26 PROCEDURE — 99999 PR PBB SHADOW E&M-EST. PATIENT-LVL IV: CPT | Mod: PBBFAC,,, | Performed by: PODIATRIST

## 2025-03-26 RX ORDER — METHYLPREDNISOLONE 4 MG/1
TABLET ORAL
Qty: 1 EACH | Refills: 0 | Status: SHIPPED | OUTPATIENT
Start: 2025-03-26

## 2025-03-26 NOTE — PROGRESS NOTES
Subjective:      Patient ID: Jerry Quiles is a 49 y.o. female.    Chief Complaint: Foot Problem (Nerve damage in pad of foot)    Sharp deep pain in the bottom of the left forefoot by the 2nd toe.  This is gradual onset with rapid worsening over the past 5 months.  Aggravated with increased weight-bearing prolonged standing some shoes.  No prior medical treatment.  Self-treatment with shoe change over-the-counter anti-inflammatory has been ineffective.  Denies trauma and surgery left foot.  Independent read x-rays left foot 03/1925 shows normal osseous and joint relationships and left forefoot without evidence for acute injury    Review of Systems   Constitutional: Negative for chills, diaphoresis, fever, malaise/fatigue and night sweats.   Cardiovascular:  Negative for claudication, cyanosis, leg swelling and syncope.   Skin:  Negative for color change, dry skin, nail changes, rash, suspicious lesions and unusual hair distribution.   Musculoskeletal:  Positive for joint pain. Negative for falls, joint swelling, muscle cramps, muscle weakness and stiffness.   Gastrointestinal:  Negative for constipation, diarrhea, nausea and vomiting.   Neurological:  Negative for brief paralysis, disturbances in coordination, focal weakness, numbness, paresthesias, sensory change and tremors.           Objective:      Physical Exam  Constitutional:       General: She is not in acute distress.     Appearance: She is well-developed. She is not diaphoretic.   Cardiovascular:      Pulses:           Popliteal pulses are 2+ on the right side and 2+ on the left side.        Dorsalis pedis pulses are 2+ on the right side and 2+ on the left side.        Posterior tibial pulses are 2+ on the right side and 2+ on the left side.      Comments: Capillary refill 3 seconds all toes/distal feet, all toes/both feet warm to touch.      Negative lymphadenopathy bilateral popliteal fossa and tarsal tunnel.      Negavie lower extremity edema  bilateral.    Musculoskeletal:      Right ankle: No swelling, deformity, ecchymosis or lacerations. Normal range of motion. Normal pulse.      Right Achilles Tendon: Normal. No defects. Villasenor's test negative.      Comments: Pain to palpation inferior mtpj to left without evidence of trauma or infection.    Negative Lachman's test left 2nd MTPJ    Ankle dorsiflexion decreased at <10 degrees bilateral with moderate increase with knee flexion bilateral.    Hyper mobile 1st ray left with a proximally 8 or 9 mm dorsal displacement from neutral.   Lymphadenopathy:      Lower Body: No right inguinal adenopathy. No left inguinal adenopathy.      Comments: Negative lymphadenopathy bilateral popliteal fossa and tarsal tunnel.    Negative lymphangitic streaking bilateral feet/ankles/legs.   Skin:     General: Skin is warm and dry.      Capillary Refill: Capillary refill takes 2 to 3 seconds.      Coloration: Skin is not pale.      Findings: No abrasion, bruising, burn, ecchymosis, erythema, laceration, lesion or rash.      Nails: There is no clubbing.      Comments: Skin is normal age and health appropriate color, turgor, texture, and temperature bilateral lower extremities without ulceration, hyperpigmentation, discoloration, masses nodules or cords palpated.  No ecchymosis, erythema, edema, or cardinal signs of infection bilateral lower extremities.      Neurological:      Mental Status: She is alert and oriented to person, place, and time.      Sensory: No sensory deficit.      Motor: No tremor, atrophy or abnormal muscle tone.      Gait: Gait normal.      Deep Tendon Reflexes:      Reflex Scores:       Patellar reflexes are 2+ on the right side and 2+ on the left side.       Achilles reflexes are 2+ on the right side and 2+ on the left side.     Comments: Negative tinel sign to percussion sural, superficial peroneal, deep peroneal, saphenous, and posterior tibial nerves right and left ankles and feet.      Negative  moulder sign/click bilateral all intermetatarsal spaces.       Psychiatric:         Behavior: Behavior is cooperative.               Assessment:       Encounter Diagnoses   Name Primary?    Hale's neuroma of left foot     Capsulitis Yes    Foot pain, left     Equinus contracture of ankle          Plan:       Jerry was seen today for foot problem.    Diagnoses and all orders for this visit:    Capsulitis    Hale's neuroma of left foot  -     Ambulatory referral/consult to Podiatry    Foot pain, left    Equinus contracture of ankle    Other orders  -     methylPREDNISolone (MEDROL DOSEPACK) 4 mg tablet; use as directed      I counseled the patient on her conditions, their implications and medical management.        Medrol Dosepak.      Patient will stretch the tendo achilles complex three times daily as demonstrated in the office.  Literature was dispensed illustrating proper stretching technique.    I applied a plantar rest strapping to the patient's left foot to offload symptomatic area, support the arch, and relieve pain.    Patient will obtain over the counter arch supports and wear them in shoes whenever possible.  Athletic shoes intended for walking or running are usually best.    Discussed conservative treatment with shoes of adequate dimensions, material, and style to alleviate symptoms and delay or prevent surgical intervention.              Follow up in about 1 month (around 4/26/2025).

## 2025-04-02 ENCOUNTER — PATIENT MESSAGE (OUTPATIENT)
Dept: PODIATRY | Facility: CLINIC | Age: 50
End: 2025-04-02
Payer: COMMERCIAL

## 2025-06-23 ENCOUNTER — PATIENT MESSAGE (OUTPATIENT)
Dept: PODIATRY | Facility: CLINIC | Age: 50
End: 2025-06-23
Payer: COMMERCIAL

## 2025-06-23 ENCOUNTER — PATIENT MESSAGE (OUTPATIENT)
Dept: INTERNAL MEDICINE | Facility: CLINIC | Age: 50
End: 2025-06-23
Payer: COMMERCIAL

## 2025-06-30 ENCOUNTER — TELEPHONE (OUTPATIENT)
Dept: INTERNAL MEDICINE | Facility: CLINIC | Age: 50
End: 2025-06-30
Payer: COMMERCIAL

## 2025-06-30 NOTE — TELEPHONE ENCOUNTER
Spoke with patient stating message below:    ----- Message from Manav Molina MD sent at 6/30/2025 11:46 AM CDT -----  Regarding: FW: Mind looking at toe on foot xray again?  Please tell patient radiologist still thinks looks negative but podiatry can do further work-up as neeed        Patient states that the podiatrist already looked at her foot again stating it looks like a shadow. She has an appointment scheduled tomorrow with podiatrist.     7/1/2025 6:45 AM (15 min)  [facility]   EST PATIENT - PODIATRY ([facility])   Authorization not required   [facility]OD[facility]R ([place])   Wicho Fitzgerald, HORACIO

## 2025-06-30 NOTE — TELEPHONE ENCOUNTER
----- Message from Manav Molina MD sent at 6/30/2025 11:46 AM CDT -----  Regarding: FW: Mind looking at toe on foot xray again?  Please tell patient radiologist still thinks looks negative but podiatry can do further work-up as neeed  ----- Message -----  From: Tim Valdes MD  Sent: 6/30/2025  10:04 AM CDT  To: Manav Molina MD  Subject: RE: Mind looking at toe on foot xray again?      Radiograph still looks negative to me.  Processes suggested by podiatry as the cause would not be expected to cause a radiographic abnormality, but would be seen on MRI    Hope that is helpful,  Tim  ----- Message -----  From: Manav Molina MD  Sent: 6/23/2025   1:26 PM CDT  To: Tim Valdes MD  Subject: Mind looking at toe on foot xray again?            Good afternoon!    Mind reviewing the xray of the foot again, namely the 2nd digit, please?    The patient is wanting a re-read despite me directing her back to the podiatrist due to continued pain.    Thanks!    Manav

## 2025-07-01 ENCOUNTER — TELEPHONE (OUTPATIENT)
Dept: INTERNAL MEDICINE | Facility: CLINIC | Age: 50
End: 2025-07-01
Payer: COMMERCIAL

## 2025-07-09 ENCOUNTER — PATIENT MESSAGE (OUTPATIENT)
Dept: PODIATRY | Facility: CLINIC | Age: 50
End: 2025-07-09

## 2025-07-11 ENCOUNTER — OFFICE VISIT (OUTPATIENT)
Dept: PODIATRY | Facility: CLINIC | Age: 50
End: 2025-07-11
Payer: COMMERCIAL

## 2025-07-11 VITALS — HEIGHT: 63 IN | WEIGHT: 192.88 LBS | BODY MASS INDEX: 34.18 KG/M2

## 2025-07-11 DIAGNOSIS — M77.9 CAPSULITIS: Primary | ICD-10-CM

## 2025-07-11 DIAGNOSIS — M24.573 EQUINUS CONTRACTURE OF ANKLE: ICD-10-CM

## 2025-07-11 DIAGNOSIS — M79.672 FOOT PAIN, LEFT: ICD-10-CM

## 2025-07-11 DIAGNOSIS — S99.922D INJURY OF PLANTAR PLATE OF LEFT FOOT, SUBSEQUENT ENCOUNTER: ICD-10-CM

## 2025-07-11 PROCEDURE — 99999 PR PBB SHADOW E&M-EST. PATIENT-LVL III: CPT | Mod: PBBFAC,,, | Performed by: PODIATRIST

## 2025-07-11 NOTE — PROGRESS NOTES
Subjective:      Patient ID: Jerry Quiles is a 49 y.o. female.    Chief Complaint: No chief complaint on file.    Continues sharp worsening pain bottom left forefoot.  Patient has been stretching using over-the-counter inserts wears athletic shoes took steroid Dosepak which helped for a few days but then shoes returned to the normal presenting pain level.  Today overall she is worse than last time.  Denies trauma.  Denies surgery    Review of Systems   Constitutional: Negative for chills, diaphoresis, fever, malaise/fatigue and night sweats.   Cardiovascular:  Negative for claudication, cyanosis, leg swelling and syncope.   Skin:  Negative for color change, dry skin, nail changes, rash, suspicious lesions and unusual hair distribution.   Musculoskeletal:  Positive for joint pain. Negative for falls, joint swelling, muscle cramps, muscle weakness and stiffness.   Gastrointestinal:  Negative for constipation, diarrhea, nausea and vomiting.   Neurological:  Negative for brief paralysis, disturbances in coordination, focal weakness, numbness, paresthesias, sensory change and tremors.           Objective:      Physical Exam  Constitutional:       General: She is not in acute distress.     Appearance: She is well-developed. She is not diaphoretic.   Cardiovascular:      Pulses:           Popliteal pulses are 2+ on the right side and 2+ on the left side.        Dorsalis pedis pulses are 2+ on the right side and 2+ on the left side.        Posterior tibial pulses are 2+ on the right side and 2+ on the left side.      Comments: Capillary refill 3 seconds all toes/distal feet, all toes/both feet warm to touch.      Negative lymphadenopathy bilateral popliteal fossa and tarsal tunnel.      Negavie lower extremity edema bilateral.    Musculoskeletal:      Right ankle: No swelling, deformity, ecchymosis or lacerations. Normal range of motion. Normal pulse.      Right Achilles Tendon: Normal. No defects. Villasenor's test negative.       Comments: Pain to palpation inferior mtpj to left without evidence of trauma or infection.    Moderate positive Lachman's test left 2nd MTPJ    Ankle dorsiflexion decreased at <10 degrees bilateral with moderate increase with knee flexion bilateral.    Hyper mobile 1st ray left with a proximally 8 or 9 mm dorsal displacement from neutral.   Lymphadenopathy:      Lower Body: No right inguinal adenopathy. No left inguinal adenopathy.      Comments: Negative lymphadenopathy bilateral popliteal fossa and tarsal tunnel.    Negative lymphangitic streaking bilateral feet/ankles/legs.   Skin:     General: Skin is warm and dry.      Capillary Refill: Capillary refill takes 2 to 3 seconds.      Coloration: Skin is not pale.      Findings: No abrasion, bruising, burn, ecchymosis, erythema, laceration, lesion or rash.      Nails: There is no clubbing.      Comments:   Skin is normal age and health appropriate color, turgor, texture, and temperature bilateral lower extremities without ulceration, hyperpigmentation, discoloration, masses nodules or cords palpated.  No ecchymosis, erythema, edema, or cardinal signs of infection bilateral lower extremities.    Neurological:      Mental Status: She is alert and oriented to person, place, and time.      Sensory: No sensory deficit.      Motor: No tremor, atrophy or abnormal muscle tone.      Gait: Gait normal.      Deep Tendon Reflexes:      Reflex Scores:       Patellar reflexes are 2+ on the right side and 2+ on the left side.       Achilles reflexes are 2+ on the right side and 2+ on the left side.     Comments: Negative tinel sign to percussion sural, superficial peroneal, deep peroneal, saphenous, and posterior tibial nerves right and left ankles and feet.      Negative moulder sign/click bilateral all intermetatarsal spaces.       Psychiatric:         Behavior: Behavior is cooperative.               Assessment:       Encounter Diagnoses   Name Primary?    Capsulitis Yes     Foot pain, left     Equinus contracture of ankle     Injury of plantar plate of left foot, subsequent encounter          Plan:       Diagnoses and all orders for this visit:    Capsulitis  -     MRI Forefoot WO Contrast LT; Future    Foot pain, left  -     MRI Forefoot WO Contrast LT; Future    Equinus contracture of ankle  -     MRI Forefoot WO Contrast LT; Future    Injury of plantar plate of left foot, subsequent encounter  -     MRI Forefoot WO Contrast LT; Future      I counseled the patient on her conditions, their implications and medical management.    I applied a plantar rest strapping to the patient's left foot to offload symptomatic area, support the arch, and relieve pain.      Fx boot left-ambulate to tolerance.    MRI forefoot left    Consult PT    Rx custom orthotics    6 weeks          No follow-ups on file.

## 2025-07-24 ENCOUNTER — HOSPITAL ENCOUNTER (OUTPATIENT)
Dept: RADIOLOGY | Facility: HOSPITAL | Age: 50
Discharge: HOME | End: 2025-07-24
Attending: PODIATRIST
Payer: COMMERCIAL

## 2025-07-24 DIAGNOSIS — M79.672 FOOT PAIN, LEFT: ICD-10-CM

## 2025-07-24 DIAGNOSIS — S99.922D INJURY OF PLANTAR PLATE OF LEFT FOOT, SUBSEQUENT ENCOUNTER: ICD-10-CM

## 2025-07-24 DIAGNOSIS — M24.573 EQUINUS CONTRACTURE OF ANKLE: ICD-10-CM

## 2025-07-24 DIAGNOSIS — M77.9 CAPSULITIS: ICD-10-CM

## 2025-07-24 PROCEDURE — 73718 MRI LOWER EXTREMITY W/O DYE: CPT | Mod: 26,LT,, | Performed by: RADIOLOGY

## 2025-07-24 PROCEDURE — 73718 MRI LOWER EXTREMITY W/O DYE: CPT | Mod: TC,PO,LT

## 2025-07-30 ENCOUNTER — OFFICE VISIT (OUTPATIENT)
Dept: PODIATRY | Facility: CLINIC | Age: 50
End: 2025-07-30
Payer: COMMERCIAL

## 2025-07-30 DIAGNOSIS — S99.922D INJURY OF PLANTAR PLATE OF LEFT FOOT, SUBSEQUENT ENCOUNTER: ICD-10-CM

## 2025-07-30 DIAGNOSIS — B35.1 ONYCHOMYCOSIS DUE TO DERMATOPHYTE: ICD-10-CM

## 2025-07-30 DIAGNOSIS — M21.612 BUNION, LEFT: ICD-10-CM

## 2025-07-30 DIAGNOSIS — M77.42 METATARSALGIA, LEFT FOOT: Primary | ICD-10-CM

## 2025-07-30 DIAGNOSIS — M77.52 CAPSULITIS OF TOE OF LEFT FOOT: ICD-10-CM

## 2025-07-30 PROCEDURE — 99999 PR PBB SHADOW E&M-EST. PATIENT-LVL III: CPT | Mod: PBBFAC,,, | Performed by: PODIATRIST

## 2025-07-30 RX ORDER — DEXAMETHASONE SODIUM PHOSPHATE 4 MG/ML
4 INJECTION, SOLUTION INTRA-ARTICULAR; INTRALESIONAL; INTRAMUSCULAR; INTRAVENOUS; SOFT TISSUE
Status: DISCONTINUED | OUTPATIENT
Start: 2025-07-30 | End: 2025-07-30 | Stop reason: HOSPADM

## 2025-07-30 RX ORDER — CICLOPIROX 80 MG/ML
SOLUTION TOPICAL NIGHTLY
Qty: 6.6 ML | Refills: 6 | Status: SHIPPED | OUTPATIENT
Start: 2025-07-30

## 2025-07-30 RX ADMIN — DEXAMETHASONE SODIUM PHOSPHATE 4 MG: 4 INJECTION, SOLUTION INTRA-ARTICULAR; INTRALESIONAL; INTRAMUSCULAR; INTRAVENOUS; SOFT TISSUE at 01:07

## 2025-07-30 NOTE — PATIENT INSTRUCTIONS
Capsulitis of the Second Toe          What Is Capsulitis of the Second Toe?    Ligaments surrounding the joint at the base of the second toe form a capsule, which helps the joint to function properly. Capsulitis is a condition in which these ligaments have become inflamed. Although capsulitis can also occur in the joints of the third or fourth toes, it most commonly affects the second toe. This inflammation causes considerable discomfort and, if left untreated, can eventually lead to a weakening of surrounding ligaments that can cause dislocation of the toe. Capsulitis--also referred to as predislocation syndrome--is a common condition that can occur at any age.     Causes Capsulitis of the second toe  It is generally believed that capsulitis of the second toe is a result of abnormal foot mechanics, where the ball of the foot beneath the toe joint takes an excessive amount of weightbearing pressure.    Certain conditions or characteristics can make a person prone to experiencing excessive pressure on the ball of the foot. These most commonly include a severe bunion deformity, a second toe longer than the big toe, an arch that is structurally unstable and a tight calf muscle.    Symptoms  Because capsulitis of the second toe is a progressive disorder and usually worsens if left untreated, early recognition and treatment are important. In the earlier stages--the best time to seek treatment--the symptoms may include:    Pain, particularly on the ball of the foot. It can feel like theres a marble in the shoe or a sock is bunched up  Swelling in the area of pain, including the base of the toe  Difficulty wearing shoes  Pain when walking barefoot     In more advanced stages, the supportive ligaments weaken, leading to failure of the joint to stabilize the toe. The unstable toe drifts toward the big toe and eventually crosses over and lies on top of the big toe--resulting in crossover toe, the end stage of capsulitis. The  symptoms of crossover toe are the same as those experienced during the earlier stages. Although the crossing over of the toe usually occurs over a period of time, it can appear more quickly if caused by injury or overuse.    Diagnosis  An accurate diagnosis is essential because the symptoms of capsulitis can be similar to those of a condition called Hales neuroma, which is treated differently from capsulitis. In arriving at a diagnosis, the foot and ankle surgeon will examine the foot, press on it and maneuver it to reproduce the symptoms. The surgeon will also look for potential causes and test the stability of the joint. X-rays are usually ordered, and other imaging studies are sometimes needed.    Nonsurgical Treatment  The best time to treat capsulitis of the second toe is during the early stages, before the toe starts to drift toward the big toe. At that time, nonsurgical approaches can be used to stabilize the joint, reduce the symptoms and address the underlying cause of the condition.    The foot and ankle surgeon may select one or more of the following options for early treatment of capsulitis:    Rest and ice. Staying off the foot and applying ice packs help reduce the swelling and pain. Apply an ice pack, placing a thin towel between the ice and the skin. Use ice for 20 minutes and then wait at least 40 minutes before icing again.  Oral medications. Nonsteroidal anti-inflammatory drugs (NSAIDs), such as ibuprofen, may help relieve the pain and inflammation.  Taping/splinting. It may be necessary to tape the toe so that it will stay in the correct position. This helps relieve the pain and prevent further drifting of the toe.  Stretching. Stretching exercises may be prescribed for patients who have tight calf muscles.  Shoe modifications. Supportive shoes with stiff soles are recommended because they control the motion and lessen the amount of pressure on the ball of the foot.  Orthotic devices. Custom  shoe inserts are often very beneficial. These include arch supports or a metatarsal pad that distributes the weight away from the joint.     When Is Surgery Needed?  Once the second toe starts moving toward the big toe, it will never go back to its normal position unless surgery is performed. The foot and ankle surgeon will select the procedure or combination of procedures best suited to the individual patient.      Recommended OTC orthotics:  -powerstep  -superfeet    Recommended shoegear:  -new balance  -ascics  -morriso  -urbina        Equinus          What Is Equinus?    Equinus is a condition in which the upward bending motion of the ankle joint is limited. Someone with equinus lacks the flexibility to bring the top of the foot toward the front of the leg. Equinus can occur in one or both feet. When it involves both feet, the limitation of motion is sometimes worse in one foot than in the other.    People with equinus develop ways to compensate for their limited ankle motion, and this often leads to other foot, leg or back problems. The most common methods of compensation are flattening of the arch or picking up the heel early when walking, placing increased pressure on the ball of the foot. Other patients compensate by toe walking, while a smaller number take steps by bending abnormally at the hip or knee.    Causes  There are several possible causes for the limited range of ankle motion. Often, it is due to tightness in the Achilles tendon or calf muscles (the soleus muscle and/or gastrocnemius muscle). In some patients, this tightness is congenital (present at birth), and sometimes it is an inherited trait. Other patients acquire the tightness from being in a cast, being on crutches or frequently wearing high-heeled shoes. In addition, diabetes can affect the fibers of the Achilles tendon and cause tightness. Sometimes equinus is related to a bone blocking the ankle motion. For example, a fragment of a broken  bone following an ankle injury, or bone block, can get in the way and restrict motion. Equinus may also result from one leg being shorter than the other. Less often, equinus is caused by spasms in the calf muscle. These spasms may be signs of an underlying neurologic disorder.      Foot Problems Related to Equinus  Depending on how a patient compensates for the inability to bend properly at the ankle, a variety of foot conditions can develop, including:    Plantar fasciitis (arch/heel pain)  Calf cramping  Tendonitis (inflammation in the Achilles tendon)  Metatarsalgia (pain and/or callusing on the ball of the foot)  Flatfoot  Arthritis of the midfoot (middle area of the foot)  Pressure sores on the ball of the foot or the arch  Bunions and hammertoes  Ankle pain  Shin splints     Diagnosis  Most patients with equinus are unaware they have this condition when they first visit the doctor. Instead, they come to the doctor seeking relief for foot problems associated with equinus.    To diagnose equinus, the foot and ankle surgeon will evaluate the ankle's range of motion when the knee is flexed (bent) as well as extended (straightened). This enables the surgeon to identify whether the tendon or muscle is tight and to assess whether bone is interfering with ankle motion. X-rays may also be ordered. In some cases, the foot and ankle surgeon may refer the patient for neurologic evaluation.    Nonsurgical Treatment  Treatment includes strategies aimed at relieving the symptoms and conditions associated with equinus. In addition, the patient is treated for the equinus itself through one or more of the following options:    Night splint. The foot may be placed in a splint at night to keep it in a position that helps reduce tightness of the calf muscle.  Heel lifts. Placing heel lifts inside the shoes or wearing shoes with a moderate heel takes stress off the Achilles tendon when walking and may reduce symptoms.  Arch  supports or orthotic devices. Custom orthotic devices that fit into the shoe are often prescribed to keep weight distributed properly and to help control muscle/tendon imbalance.  Physical therapy. To help remedy muscle tightness, exercises that stretch the calf muscle(s) are recommended.     When Is Surgery Needed?  In some cases, surgery may be needed to correct the cause of equinus if it is related to a tight tendon or a bone blocking the ankle motion. The foot and ankle surgeon will determine the type of procedure that is best suited to the individual patient.

## 2025-07-30 NOTE — PROGRESS NOTES
Subjective:      Patient ID: Jerry Quiles is a 49 y.o. female.    Chief Complaint: Foot Pain (Pain in between 2nd and big toe)    Continues sharp worsening pain bottom left forefoot.  Patient has been stretching using over-the-counter inserts wears athletic shoes took steroid Dosepak which helped for a few days but then shoes returned to the normal presenting pain level.  Today overall she is worse than last time.  Denies trauma.  Denies surgery    7/30/25:  Hx as above. F/u for metatarsalgia L foot. Here for 2nd opinion.     Review of Systems   Constitutional: Negative for chills, diaphoresis, fever, malaise/fatigue and night sweats.   Cardiovascular:  Negative for claudication, cyanosis, leg swelling and syncope.   Skin:  Negative for color change, dry skin, nail changes, rash, suspicious lesions and unusual hair distribution.   Musculoskeletal:  Positive for joint pain. Negative for falls, joint swelling, muscle cramps, muscle weakness and stiffness.   Gastrointestinal:  Negative for constipation, diarrhea, nausea and vomiting.   Neurological:  Negative for brief paralysis, disturbances in coordination, focal weakness, numbness, paresthesias, sensory change and tremors.           Objective:      Physical Exam  Constitutional:       General: She is not in acute distress.     Appearance: She is well-developed. She is not diaphoretic.   Cardiovascular:      Pulses:           Popliteal pulses are 2+ on the right side and 2+ on the left side.        Dorsalis pedis pulses are 2+ on the right side and 2+ on the left side.        Posterior tibial pulses are 2+ on the right side and 2+ on the left side.      Comments: Capillary refill 3 seconds all toes/distal feet, all toes/both feet warm to touch.      Negative lymphadenopathy bilateral popliteal fossa and tarsal tunnel.      Negavie lower extremity edema bilateral.    Musculoskeletal:      Right ankle: No swelling, deformity, ecchymosis or lacerations. Normal range of  motion. Normal pulse.      Right Achilles Tendon: Normal. No defects. Villasenor's test negative.      Comments: Pain to palpation inferior mtpj to left without evidence of trauma or infection.    Moderate positive Lachman's test left 2nd MTPJ    Ankle dorsiflexion decreased at <10 degrees bilateral with moderate increase with knee flexion bilateral.    Hyper mobile 1st ray left with a proximally 8 or 9 mm dorsal displacement from neutral.   Lymphadenopathy:      Lower Body: No right inguinal adenopathy. No left inguinal adenopathy.      Comments: Negative lymphadenopathy bilateral popliteal fossa and tarsal tunnel.    Negative lymphangitic streaking bilateral feet/ankles/legs.   Skin:     General: Skin is warm and dry.      Capillary Refill: Capillary refill takes 2 to 3 seconds.      Coloration: Skin is not pale.      Findings: No abrasion, bruising, burn, ecchymosis, erythema, laceration, lesion or rash.      Nails: There is no clubbing.      Comments:   Skin is normal age and health appropriate color, turgor, texture, and temperature bilateral lower extremities without ulceration, hyperpigmentation, discoloration, masses nodules or cords palpated.  No ecchymosis, erythema, edema, or cardinal signs of infection bilateral lower extremities.    Neurological:      Mental Status: She is alert and oriented to person, place, and time.      Sensory: No sensory deficit.      Motor: No tremor, atrophy or abnormal muscle tone.      Gait: Gait normal.      Deep Tendon Reflexes:      Reflex Scores:       Patellar reflexes are 2+ on the right side and 2+ on the left side.       Achilles reflexes are 2+ on the right side and 2+ on the left side.     Comments: Negative tinel sign to percussion sural, superficial peroneal, deep peroneal, saphenous, and posterior tibial nerves right and left ankles and feet.      Negative moulder sign/click bilateral all intermetatarsal spaces.       Psychiatric:         Behavior: Behavior is  cooperative.     MRI Forefoot WO Contrast LT  Result Date: 7/24/2025  EXAMINATION: MRI FOREFOOT WO CONTRAST LT CLINICAL HISTORY: Foot trauma, tendon injury or dislocation suspected, xray equivocal (Age >= 6y);plantar plate?;  Enthesopathy, unspecified TECHNIQUE: Multiplanar MR imaging of the left forefoot was performed without IV contrast administration.  A vitamin-E capsule was placed on the skin surface at the site of clinical discomfort over the plantar aspect of the 2nd metatarsal head. COMPARISON: Left foot x-rays- 03/19/2025 FINDINGS: There is mild subcutaneous edema like signal present along the plantar surface of the 2nd metatarsal head and 2nd toe at the level of the 2nd MTP and 2nd PIP joints in the area of clinical concern, most likely post-traumatic contusion..  There is mild tenosynovitis and/or post-traumatic contusion involving the flexor tendon of the 2nd foot at the level of the 2nd MTP joint.  No high-grade partial or full-thickness flexor tendon tear appreciated.  There is intermetatarsal bursitis present between the head of the great toe metatarsal and 2nd metatarsal.  There is also mild intermetatarsal bursitis noted within the 2nd, 3rd, and 4th intermetatarsal spaces on series 5, image 10-13.  No MTP joint effusion.  There is subcutaneous edema like signal present along the dorsal aspect of the great toe TMT joint, possibly post-traumatic contusion. There is a 5 x 5 x 10 mm probable Hale's neuroma observed along the plantar aspect of the 2nd intermetatarsal space between the head of the 2nd and 3rd metatarsals in the vicinity of the inter digital nerve.  Please correlate clinically for symptoms of a Hale's neuroma.  Post-traumatic contusion could theoretically produce an identical appearance. The visualized osseous structures show no evidence of acute fracture or pathologic marrow replacement process.  No erosions.  There is bunion formation at the great toe metatarsal head and great toe  hallux valgus.  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  No imaging evidence of acute sesamoiditis involving the sesamoid bones beneath the great toe metatarsal head.  The FHL is intact.  The plantar fascia appears intact where imaged.  The extensor tendons are intact.  There is mild edema like signal present within the cuneocuboid ligament (series 8, image 14, series 5 image 12, and series 7, image 6) and within the interosseous intercuneiform ligament between the intermediate and lateral cuneiform bones on series 5, image 9-10 suggesting possible mild sprains of these ligamentous structures.     1. 5 x 5 x 10 mm focus of isointense T1 signal/hyperintense T2 signal within the 2nd intermetatarsal space between the head of the 2nd and 3rd metatarsal in the vicinity of the plantar plate/inter digital nerve.  A more ends neuroma cannot be excluded.  Post-traumatic contusion related to recent trauma could produce a similar appearance.  Please correlate clinically. 2. Subcutaneous edema like signal, most likely post could traumatic contusion, along the plantar aspect of the 2nd toe at the level of the 2nd MTP joint and 2nd PIP joint with adjacent mild 2nd flexor tendon tenosynovitis and/or post-traumatic contusion. 3. No acute, displaced stress fracture identified. 4. Bunion formation at the great toe metatarsal head, degenerative change of the great toe MTP joint, and great toe hallux valgus. 5. Intermetatarsal bursitis at the 1st-4th intermetatarsal spaces, most pronounced between the head of the great toe metatarsal and 2nd metatarsal head. 6. Possible mild sprains of the cuneocuboid and interosseous intercuneiform ligaments. Electronically signed by: Vitaly Mas MD Date:    07/24/2025 Time:    09:35            Assessment:       Encounter Diagnoses   Name Primary?    Metatarsalgia, left foot Yes    Capsulitis of toe of left foot     Injury of plantar plate of left foot, subsequent  encounter     Bunion, left          Plan:       Jerry was seen today for foot pain.    Diagnoses and all orders for this visit:    Metatarsalgia, left foot    Capsulitis of toe of left foot  -     Small Joint Aspiration/Injection    Injury of plantar plate of left foot, subsequent encounter    Bunion, left      Small Joint Aspiration/Injection: L second MTP    Date/Time: 7/30/2025 1:45 PM    Performed by: Adrien Mcnally Jr., DPM  Authorized by: dArien Mcnally Jr., DPM    Site marked: the procedure site was marked    Timeout: prior to procedure the correct patient, procedure, and site was verified    Prep: patient was prepped and draped in usual sterile fashion      Local anesthesia used?: Yes    Anesthesia:  Local infiltration  Local anesthetic:  Bupivacaine 0.5% with epinephrine and co-phenylcaine spray  Anesthetic total (ml):  2    Location:  Second toe  Site:  L second MTP  Ultrasonic guidance for needle placement?: No    Needle size:  25 G  Approach:  Dorsal  Medications:  4 mg dexAMETHasone 4 mg/mL  Patient tolerance:  Patient tolerated the procedure well with no immediate complications        I counseled the patient on her conditions, their implications and medical management.    DC CAM boot    MRI forefoot left reviewed    Agree with previous providers assessment and plan. Has been treated to standard of care.    Possible capsulitis from chronic overload of 2nd mtpj from bunion    Rx custom orthotics - cont prn    Encouraged to go to formal PT    Px as above    Rec f/u w/ provider          Follow up if symptoms worsen or fail to improve.

## 2025-08-09 ENCOUNTER — PATIENT MESSAGE (OUTPATIENT)
Dept: PODIATRY | Facility: CLINIC | Age: 50
End: 2025-08-09
Payer: COMMERCIAL